# Patient Record
Sex: FEMALE | Race: WHITE
[De-identification: names, ages, dates, MRNs, and addresses within clinical notes are randomized per-mention and may not be internally consistent; named-entity substitution may affect disease eponyms.]

---

## 2017-01-04 NOTE — DIAGNOSTIC IMAGING REPORT
Transabdominal and transvaginal pelvic ultrasound.



INDICATION: Pelvic pain.



FINDINGS: The uterus is 9 x 6 x 4.1 cm. The endometrial stripe

is 0.4 cm. Trace amount of luminal fluid is seen within the

endometrial cavity.



The right ovary is 3.9 x 3.4 x 4.6 cm with arterial waveforms

demonstrated. The left ovary is 3.4 x 2.7 x 2.1 cm with arterial

waveforms also seen. There are multiple follicles noted in both

ovaries with a dominant follicle/small simple-appearing cyst

measuring 3.2 cm in the right ovary noted at this time. This

appears to be simple with resolution of the previously seen

thickened wall.



IMPRESSION: No significant free fluid in the pelvis. Remaining

3.2 cm simple-appearing right ovarian cyst. No suspicious

lesion.



Dictated by:



Dictated on workstation # [id number]

## 2017-04-06 NOTE — DIAGNOSTIC IMAGING REPORT
PROCEDURE: CT abdomen and pelvis with contrast, rule out

appendicitis.



TECHNIQUE: Multiple contiguous axial images were obtained

through the abdomen and pelvis after the administration of

intravenous contrast.



INDICATION: Right lower quadrant pain for one day, nausea,

headache.



CONTRAST: 100 mL Omnipaque 350 was given intravenously.



Comparison study: CT scan dated 12/12/2011.



FINDINGS: The lung bases are clear. The gallbladder is seen to

be absent. No ductal dilatation is present. The liver, spleen,

pancreas, adrenal glands, and kidneys appear normal. There is no

ascites or free air. There is normal appearance of the appendix

and bowel loops. Uterus appears normal. Several follicles are

seen in both ovaries. There is no fluid in the cul-de-sac. No

abnormal adenopathy is present. The vascular and osseous

structures appear normal.



IMPRESSION: There are bilateral ovarian follicles. No acute

findings are present.













Dictated by:



Dictated on workstation # [id number]

## 2017-04-06 NOTE — ED GENERAL
General


Chief Complaint:  General Problems/Pain


Stated Complaint:  RIGHT SIDE ABD PAIN HEADACHE


Nursing Triage Note:  


C/O H/A SINCE YESTERDAY. C/O R-SIDE PAIN WORSE WHEN COUGHING OR LAUGHING. 

DENIES 


FLU-LIKE SXS. DENIES NECK STIFFNESS. NO HX OF H/A. DID SEE A GLARE YESTERDAY 


WITH H/A.


Nursing Sepsis Screen:  No Definite Risk


Source of Information:  Patient


Exam Limitations:  No Limitations





History of Present Illness


Time Seen by Provider:  11:16


Initial Comments


31-year-old female patient presents to the emergency department complains of 

right lower quadrant pain and headache beginning yesterday.  Patient denies 

diarrhea, vomiting, dysuria, frequency, hematuria.  Does complain of mild 

nausea.  Denies upper respiratory symptoms.


patient is a  A1(D&C for conjoined twins) female.


Timing/Duration:  12-24 Hours, Getting Worse


Modifying Factors:  worse with Movement, worse with Other (worse with laughing, 

coughing, palpation.)





Allergies and Home Medications


Allergies


Coded Allergies:  


     Zolpidem Tartrate (Unverified  Adverse Reaction, Unknown, 5/3/11)





Constitutional:  chills, No fever, No malaise


EENTM:  no symptoms reported


Respiratory:  No cough, No phlegm, No short of breath


Cardiovascular:  No chest pain, No syncope


Gastrointestinal:  abdominal pain (RLQ), No constipation, No diarrhea, loss of 

appetite, nausea, No vomiting


Genitourinary:  No decreased output, No discharge, No dysuria, No frequency, No 

hematuria, No pain


Pregnant:  No


Musculoskeletal:  no symptoms reported


Skin:  no symptoms reported


Psychiatric/Neurological:  Headache, Denies Numbness, Denies Paresthesia, 

Denies Seizure, Denies Tingling, Denies Weakness


All Other Systems Reviewed


Negative Unless Noted:  Yes (Negative except where noted.)





Past Medical-Social-Family Hx


Patient Social History


Alcohol Use:  Occasionally Uses


Recreational Drug Use:  No


Smoking Status:  Current Everyday Smoker


2nd Hand Smoke Exposure:  Yes


Recent Foreign Travel:  No


Contact w/Someone Who Travel:  No


Recent Infectious Disease Expo:  No


Recent Hopitalizations:  No





Surgeries


HX Surgeries:  Yes (enlargement of urinary tract, PATIENCE CHOLEY )


Surgeries:  Gallbladder





Respiratory


Hx Respiratory Disorders:  No





Cardiovascular


Hx Cardiac Disorders:  No





Neurological


Hx Neurological Disorders:  No





Reproductive System


Pregnant:  No (IMPLANT)


Hx :  2


Hx Para:  1


Hx Total # of Abortions (Spona:  1 (d&c for conjoined twins.)


Hx Reproductive Disorders:  No (IMPLANT DEVICE)


Sexually Transmitted Disease:  No





Genitourinary


Hx Genitourinary Disorders:  No





Gastrointestinal


Hx Gastrointestinal Disorders:  No





Musculoskeletal


Hx Musculoskeletal Disorders:  No





Endocrine


Hx Endocrine Disorders:  No





HEENT


HX ENT Disorders:  No





Psychosocial


Hx Psychiatric Problems:  No





Blood Transfusions


Hx Blood Disorders:  No





Reviewed Nursing Assessment


Reviewed/Agree w Nursing PMH:  Yes





Family Medical History


Significant Family History:  No Pertinent Family Hx





Physical Exam


Vital Signs





Vital Sign - Last 12Hours








 17





 10:31


 


Temp 98.7


 


Pulse 73


 


Resp 20


 


B/P (MAP) 94/50


 


Pulse Ox 100


 


O2 Delivery Room Air





Capillary Refill : Less Than 3 Seconds


General Appearance:  No Apparent Distress, WD/WN


HEENT:  PERRL/EOMI, Pharynx Normal


Neck:  Normal Inspection, Supple


Respiratory:  Lungs Clear, Normal Breath Sounds, No Respiratory Distress


Cardiovascular:  Regular Rate, Rhythm, No Murmur


Gastrointestinal:  Normal Bowel Sounds, No Organomegaly, Soft, No Distended, 

Guarding (RLQ), Rebound (RLQ), Tenderness (RLQ), Other (positive Rovsing sign)


Back:  Normal Inspection, No CVA Tenderness


Extremity:  Normal Capillary Refill, No Pedal Edema


Neurologic/Psychiatric:  Alert, Oriented x3, No Motor/Sensory Deficits, Normal 

Mood/Affect, CNs II-XII Norm as Tested


Skin:  Normal Color, Warm/Dry





Progress/Results/Core Measures


Results/Orders


Lab Results





Laboratory Tests








Test


  17


11:40 17


13:20 Range/Units


 


 


Urine Color YELLOW    


 


Urine Clarity CLEAR    


 


Urine pH 5   5-9  


 


Urine Specific Gravity 1.015 L  1.016-1.022  


 


Urine Protein NEGATIVE   NEGATIVE  


 


Urine Glucose (UA) NEGATIVE   NEGATIVE  


 


Urine Ketones NEGATIVE   NEGATIVE  


 


Urine Nitrite NEGATIVE   NEGATIVE  


 


Urine Bilirubin NEGATIVE   NEGATIVE  


 


Urine Urobilinogen NORMAL   NORMAL  MG/DL


 


Urine Leukocyte Esterase 1+ H  NEGATIVE  


 


Urine RBC (Auto) NEGATIVE   NEGATIVE  


 


Urine RBC RARE    /HPF


 


Urine WBC 0-2    /HPF


 


Urine Squamous Epithelial


Cells >50 H


  


   /HPF


 


 


Urine Crystals NONE    /LPF


 


Urine Bacteria NEGATIVE    /HPF


 


Urine Casts NONE    /LPF


 


Urine Mucus NEGATIVE    /LPF


 


Urine Culture Indicated NO    


 


White Blood Count


  


  7.6 


  4.3-11.0


10^3/uL


 


Red Blood Count


  


  4.00 L


  4.35-5.85


10^6/uL


 


Hemoglobin  12.8  11.5-16.0  G/DL


 


Hematocrit  37  35-52  %


 


Mean Corpuscular Volume  93  80-99  FL


 


Mean Corpuscular Hemoglobin  32  25-34  PG


 


Mean Corpuscular Hemoglobin


Concent 


  34 


  32-36  G/DL


 


 


Red Cell Distribution Width  12.0  10.0-14.5  %


 


Platelet Count


  


  268 


  130-400


10^3/uL


 


Mean Platelet Volume  9.2  7.4-10.4  FL


 


Neutrophils (%) (Auto)  66  42-75  %


 


Lymphocytes (%) (Auto)  26  12-44  %


 


Monocytes (%) (Auto)  7  0-12  %


 


Eosinophils (%) (Auto)  1  0-10  %


 


Basophils (%) (Auto)  0  0-10  %


 


Neutrophils # (Auto)  5.0  1.8-7.8  X 10^3


 


Lymphocytes # (Auto)  1.9  1.0-4.0  X 10^3


 


Monocytes # (Auto)  0.6  0.0-1.0  X 10^3


 


Eosinophils # (Auto)


  


  0.1 


  0.0-0.3


10^3/uL


 


Basophils # (Auto)


  


  0.0 


  0.0-0.1


10^3/uL


 


Sodium Level  138  135-145  MMOL/L


 


Potassium Level  3.9  3.6-5.0  MMOL/L


 


Chloride Level  109 H   MMOL/L


 


Carbon Dioxide Level  22  21-32  MMOL/L


 


Anion Gap  7  5-14  MMOL/L


 


Blood Urea Nitrogen  12  7-18  MG/DL


 


Creatinine


  


  0.84 


  0.60-1.30


MG/DL


 


Estimat Glomerular Filtration


Rate 


  > 60 


   


 


 


BUN/Creatinine Ratio  14   


 


Glucose Level  81    MG/DL


 


Calcium Level  8.4 L 8.5-10.1  MG/DL


 


Total Bilirubin  0.6  0.1-1.0  MG/DL


 


Aspartate Amino Transf


(AST/SGOT) 


  14 


  5-34  U/L


 


 


Alanine Aminotransferase


(ALT/SGPT) 


  10 


  0-55  U/L


 


 


Alkaline Phosphatase  49    U/L


 


Total Protein  5.9 L 6.4-8.2  G/DL


 


Albumin  3.7  3.2-4.5  G/DL


 


Lipase  37  8-78  U/L








JULIETA Moore


Cbc With Automated Diff (17 11:33)


Comprehensive Metabolic Panel (17 11:33)


Lipase (17 11:33)


Ua Culture If Indicated (17 11:33)


Saline Lock/Iv-Start (17 11:33)


Urine Pregnancy Bedside (17 11:33)


Ct Abd/Pelv W (Appendicitis) (17 11:33)


Iohexol Injection (Omnipaque 350 Mg/Ml 1 (17 12:00)


Ns (Ivpb) (Sodium Chloride 0.9% Ivpb Bag (17 12:00)


Ketorolac Injection (Toradol Injection) (17 14:10)





Medications Given in ED





Current Medications








 Medications  Dose


 Ordered  Sig/Fariha


 Route  Start Time


 Stop Time Status Last Admin


Dose Admin


 


 Iohexol  100 ml  ONCE  ONCE


 IV  17 12:00


 17 12:01 DC 17 12:23


100 ML


 


 Sodium Chloride  100 ml  ONCE  ONCE


 IV  17 12:00


 17 12:01 DC 17 12:23


80 ML








Vital Signs/I&O





Vital Sign - Last 12Hours








 17





 10:31


 


Temp 98.7


 


Pulse 73


 


Resp 20


 


B/P (MAP) 94/50


 


Pulse Ox 100


 


O2 Delivery Room Air














Blood Pressure Mean:  65











Diagnostic Imaging





   Diagonstic Imaging:  CT


   Plain Films/CT/US/NM/MRI:  abdomen, pelvis


Comments


FINDINGS: The lung bases are clear. The gallbladder is seen to be absent. No 

ductal dilatation is present. The liver, spleen, pancreas, adrenal glands, and 

kidneys appear normal. There is no ascites or free air. There is normal 

appearance of the appendix and bowel loops. Uterus appears normal. Several 

follicles are seen in both ovaries. There is no fluid in the cul-de-sac. No 

abnormal adenopathy is present. The vascular and osseous structures appear 

normal. IMPRESSION: There are bilateral ovarian follicles. No acute findings 

are present. Dictated on workstation # [id number]


   Reviewed:  Reviewed by Me (radiology report reviewed by me. )





Departure


Communication


Progress Notes


patient seen and evaluated.  Denies need for pain or nausea at this time. 


1350  awaiting labs.  diagnostic findings discussed with the patient.  patient 

now states she would like something for pain.


1420  all laboratory and diagnostic findings discussed with the patient.  

patient is now agitated due to not being diagnoses with appendicitis.  I have 

discussed with the patient that her WBC count is in the normal range with a CT 

scan showing follicles on the bilateral ovaries w/o appendicitis or 

inflammatory changes. I have discussed that we do not see anything concerning 

for appendicitis at this time, but to return immediately to the ED if symptoms 

worsen or change.  Patient is agitated and states "I knew this was going to 

happen!  I am in pain and that's just it!"  I've advised the patient to follow-

up with the primary care physician of her choice for recheck and further 

evaluation.





Impression


Impression:  


 Primary Impression:  


 Abdominal pain


 Qualified Codes:  R10.31 - Right lower quadrant pain


Disposition:   HOME, SELF-CARE


Condition:  Improved





Departure-Patient Inst.


Decision time for Depature:  13:53


Referrals:  


NO,LOCAL PHYSICIAN (PCP/Family)


Primary Care Physician


Patient Instructions:  Acute Abdomen (Belly Pain), Adult (DC)





Add. Discharge Instructions:  


All discharge instructions reviewed with patient and/or family. Voiced 

understanding.  Tylenol over-the-counter as directed for pain.  Ibuprofen 800 

mg by mouth every 8 hours as needed for pain.  Drink plenty of fluids.  Rest.  

Follow-up with your primary care physician for recheck, call for appointment 

time.  Return to the emergency department for worsened pain, fever, vomiting, 

inability to urinate, rectal bleeding, or any other concerns.


Work/School Note:  Local Medical Staff Listing, Work Release Form   Date Seen 

in the Emergency Department:  2017


   Return to Work:  2017











JULIETA WISE 2017 11:17

## 2017-05-07 NOTE — XMS REPORT
Patient Summary (HL7 CCD)

 Created on: 2017



HEAVEN RESENDIZ

External Reference #: [id number]

: 1985

Sex: Female



Demographics







 Address  [address] [place]  [address]

 

 Home Phone  [phone]

 

 Preferred Language  Unknown

 

 Marital Status  Unknown

 

 Taoist Affiliation  Unknown

 

 Race  WHITE

 

 Ethnic Group  Not  or 





Author







 Author  MULU FORD

 

 Organization  Unknown

 

 Address  [address]

[place]  [address]



 

 Phone  [phone]







Care Team Providers







 Care Team Member Name  Role  Phone

 

 PATRICIA MORAES MD  Attphys  [phone]



                                                                



Allergies

          Unknown or Not Available.                                            
                                            



Active Medications

          Unknown or Not Available.                                            
                                            



Problems

          Unknown or Not Available.                                            
                                            



Procedures

          





 Procedure        Code        Procedure Type        Date    

 

 Incision and drainage of hematoma, seroma or fluid collection        09571    
    CPT        2016    

 

 PREGNANCY TEST URINE        [id number]        SNOMED CT        2016    



                                                                               
                   



Results

          





 PREGNANCY TEST URINE - Collect Date/Time: 2016 09:27     

 

 Test Name        Code        Test Result        Test Units        Test Ref 
Range    

 

 PREGNANCY TEST UR        2106-3        NEGATIVE        N/A             



                                                                              



Function Status

          Unknown or Not Available.                                            
                                  



History of Immunizations

          Unknown or Not Available.                                            
                        



Plan of Treatment

          Unknown or Not Available.                                            
                        



Social History

          





 Smoking Status        Code        Start Date        End Date    

 

 Current every day smoker        [id number]                      



                                                                               
         



Vital Signs

          





 Vital Sign        Value        Unit        Date/Time        Recent/Initial?    

 

 Respiratory Rate        16        /min        2016 10:39        Initial 
VS    

 

 Heart Rate        66        /min        2016 10:39        Initial VS    

 

 O2 % BldC Oximetry        100        %        2016 10:39        Initial 
VS    

 

 BP Systolic        93        mm[Hg]        2016 10:40        Initial VS 
   

 

 BP Diastolic        46        mm[Hg]        2016 10:40        Initial VS
    

 

 BP Systolic        98        mm[Hg]        2016 11:55        Most Recent 
VS    

 

 BP Diastolic        54        mm[Hg]        2016 11:55        Most 
Recent VS    

 

 Respiratory Rate        15        /min        2016 11:55        Most 
Recent VS    

 

 Heart Rate        53        /min        2016 11:55        Most Recent VS
    

 

 O2 % BldC Oximetry        99        %        2016 11:55        Most 
Recent VS    



                                                                               
                   



Function Status

          Unknown or Not Available.                                            
    



Goals

          Unknown or Not Available.                                            
              



ASSESSMENTS

          Unknown or Not Available.                                            
                        



Health Concerns Section

          Unknown or Not Available.

## 2017-05-07 NOTE — XMS REPORT
[facility]

 Created on: 2015



Shanae Martini

External Reference #: [id number]

: 1985

Sex: Female



Demographics







 Address  [address] [place]  [address]

 

 Home Phone  [phone]

 

 Preferred Language  Unknown

 

 Marital Status  Unknown

 

 Moravian Affiliation  Unknown

 

 Race  White

 

 Ethnic Group  Not  or 





Author







 Author  SAMMY PATRICK

 

 [facility]  [employer]

 

 Address  Unknown

 

 Phone  Unavailable







Care Team Providers







 Care Team Member Name  Role  Phone

 

 SAMMY PATRICK  CP  Unavailable



                                                                



Allergies

          No Known Allergies                                                   
                                     



Problems

          





 Problem Type  Condition  Code  Onset Dates  Condition Status

 

 Assessment  Elevated prolactin level  E22.9     Active

 

 Problem  Frequent loose stools  R19.7     Active

 

 Problem  Family history of ovarian cancer  Z80.41     Active

 

 Problem  OCP (oral contraceptive pills) initiation  Z30.011     Active

 

 Problem  Tobacco use  Z72.0     Active

 

 Problem  Low back pain  M54.5     Active

 

 Problem  Depression with anxiety  F41.8     Active

 

 Problem  Substance abuse  F19.10     Active



                                                                               
                                                                               



Medications

          No Known Medications                                                 
                             



Results

          No Known Results                                                     
               



Summary Purpose

          [employer] Submission

## 2017-05-07 NOTE — XMS REPORT
[facility]

 Created on: 2015



Shanae Martini

External Reference #: [id number]

: 1985

Sex: Female



Demographics







 Address  [address] [place]  [address]

 

 Home Phone  [phone]

 

 Preferred Language  Unknown

 

 Marital Status  Unknown

 

 Mandaen Affiliation  Unknown

 

 Race  White

 

 Ethnic Group  Not  or 





Author







 Author  SHANTE VARGAS

 

 Organization  [employer]

 

 Address  Unknown

 

 Phone  Unavailable







Care Team Providers







 Care Team Member Name  Role  Phone

 

 SHANTE VARGAS  CP  Unavailable



                                                                



Allergies

          No Known Allergies                                                   
                                     



Problems

          





 Problem Type  Condition  Code  Onset Dates  Condition Status

 

 Problem  Frequent loose stools  R19.7     Active

 

 Problem  Family history of ovarian cancer  Z80.41     Active

 

 Problem  OCP (oral contraceptive pills) initiation  Z30.011     Active

 

 Problem  Tobacco use  Z72.0     Active

 

 Problem  Low back pain  M54.5     Active

 

 Problem  Depression with anxiety  F41.8     Active

 

 Problem  Substance abuse  F19.10     Active



                                                                               
                                                                     



Medications

          No Known Medications                                                 
                             



Results

          No Known Results                                                     
               



Summary Purpose

          [employer] Submission

## 2017-05-07 NOTE — XMS REPORT
[facility]

 Created on: 2016



Shanae Martini

External Reference #: [id number]

: 1985

Sex: Female



Demographics







 Address  [address] [place]  [address]

 

 Home Phone  [phone]

 

 Preferred Language  Unknown

 

 Marital Status  Unknown

 

 Jainism Affiliation  Unknown

 

 Race  White

 

 Ethnic Group  Not  or 





Author







 ALEKSANDER Chung

 

 [facility]  [employer]

 

 Address  Unknown

 

 Phone  Unavailable







Care Team Providers







 Care Team Member Name  Role  Phone

 

 ALEKSANDER HONEYCUTT  CP  Unavailable



                                                                



Allergies

          No Known Allergies                                                   
                                     



Problems

          





 Problem Type  Condition  Code  Onset Dates  Condition Status

 

 Problem  Frequent loose stools  R19.7     Active

 

 Problem  Family history of ovarian cancer  Z80.41     Active

 

 Problem  OCP (oral contraceptive pills) initiation  Z30.011     Active

 

 Problem  Tobacco use  Z72.0     Active

 

 Problem  Low back pain  M54.5     Active

 

 Problem  Depression with anxiety  F41.8     Active

 

 Problem  Substance abuse  F19.10     Active



                                                                               
                                                                     



Medications

          No Known Medications                                                 
                             



Results

          No Known Results                                                     
               



Summary Purpose

          [employer]Clinical[employer] Submission

## 2017-05-07 NOTE — XMS REPORT
[facility]

 Created on: 2016



Shanae Martini

External Reference #: [id number]

: 1985

Sex: Female



Demographics







 Address  [address] [place]  [address]

 

 Home Phone  [phone]

 

 Preferred Language  Unknown

 

 Marital Status  Unknown

 

 Synagogue Affiliation  Unknown

 

 Race  White

 

 Ethnic Group  Not  or 





Author







 ALEKSANDER Chung

 

 [facility]  [employer]

 

 Address  Unknown

 

 Phone  Unavailable







Care Team Providers







 Care Team Member Name  Role  Phone

 

 ALEKSANDER HONEYCUTT  CP  Unavailable



                                                                



Allergies, Adverse Reactions, Alerts

          





 Substance  Reaction  Event Type

 

 Ambien  Info Not Available  Drug Allergy



                                                                               
         



Problems

          





 Problem Type  Condition  Code  Onset Dates  Condition Status

 

 Assessment  Contact dermatitis and eczema due to plant  L24.7     Active

 

 Problem  Frequent loose stools  R19.7     Active

 

 Problem  Family history of ovarian cancer  Z80.41     Active

 

 Problem  OCP (oral contraceptive pills) initiation  Z30.011     Active

 

 Problem  Tobacco use  Z72.0     Active

 

 Problem  Low back pain  M54.5     Active

 

 Problem  Depression with anxiety  F41.8     Active

 

 Problem  Substance abuse  F19.10     Active



                                                                               
                                                                               



Medications

          No Known Medications                                                 
                                       



Procedures

          





 Procedure  Coding System  Code  Date

 

 THER/PROPH/DIAG INJ, SC/IM  CPT-4  80421  2016

 

 DEXAMETHASONE 4MG/ML (PER 1 MG)  CPT-4    2016

 

 DEPO MEDROL 40 MG/ML  CPT-4    2016

 

 Office Visit, Est Pt., Level 3  CPT-4  29166  2016



                                                                               
                                                 



Vital Signs

          





 Date/Time:  2016

 

 Cardiac Monitoring Heart Rate  78 bpm

 

 Weight  158.0 lbs

 

 Height  67 in

 

 Blood Pressure Diastolic  60 mmHg

 

 Blood Pressure Systolic  102 mmHg



                                                                              



Results

          No Known Results                                                     
               



Summary Purpose

          eClinicalWorks Submission

## 2017-05-07 NOTE — XMS REPORT
[facility]

 Created on: 2015



Shanae Martini

External Reference #: [id number]

: 1985

Sex: Female



Demographics







 Address  [address] 

[place]  [address]

 

 Home Phone  [phone]

 

 Preferred Language  Unknown

 

 Marital Status  Unknown

 

 Worship Affiliation  Unknown

 

 Race  White

 

 Ethnic Group  Not  or 





Author







 Author  SAMMY PATRICK

 

 [facility]  [employer]Clinical[employer]

 

 Address  Unknown

 

 Phone  Unavailable







Care Team Providers







 Care Team Member Name  Role  Phone

 

 SAMMY PATRICK    Unavailable



                                                                



Allergies, Adverse Reactions, Alerts

          





 Substance  Reaction  Event Type

 

 Ambien  Info Not Available  Drug Allergy



                                                                               
         



Problems

          





 Problem Type  Condition  Code  Onset Dates  Condition Status

 

 Assessment  Lower abdominal pain  R10.30     Active

 

 Assessment  PID (pelvic inflammatory disease)  N73.9     Active

 

 Assessment  Vaginal spotting  N92.0     Active

 

 Assessment  Oral contraceptive pill surveillance  Z30.41     Active

 

 Problem  Frequent loose stools  R19.7     Active

 

 Problem  Family history of ovarian cancer  Z80.41     Active

 

 Problem  OCP (oral contraceptive pills) initiation  Z30.011     Active

 

 Problem  Tobacco use  Z72.0     Active

 

 Problem  Low back pain  M54.5     Active

 

 Problem  Depression with anxiety  F41.8     Active

 

 Problem  Substance abuse  F19.10     Active



                                                                               
                                                                               
                              



Medications

          





 Medication  Code System  Code  Instructions  Start Date  End Date  Status  
Dosage

 

 Sprintec 28  NDC  [address]-31  0.25-35 MG-MCG Orally Once a day  2015        1 tablet

 

 Ceftriaxone Sodium  NDC  [address]-04  250 MG Injection once  2015   
     as directed

 

 Doxycycline Monohydrate  NDC  [address]-02  100 MG Orally every 12 hrs  Nov 30
, 2015  Dec 14, 2015     1 capsule



                                                                               
                             



Procedures

          





 Procedure  Coding System  Code  Date

 

 URINE PREGNANCY TEST  CPT-4  08136  2015

 

 ROCEPHIN 250 MG (IM)  CPT-4    2015

 

 URINALYSIS, AUTO, W/O SCOPE  CPT-4  26102  2015

 

 URINE CULTURE/COLONY COUNT  CPT-4  81905  2015

 

 THER/PROPH/DIAG INJ, SC/IM  CPT-4  60096  2015

 

 Office Visit, Est Pt., Level 3  CPT-4  39078  2015



                                                                               
                                                                     



Vital Signs

          





 Date/Time:  2015

 

 Temperature  98.3 F

 

 Weight  168.0 lbs

 

 Height  67 in

 

 BMI  26.31 Index

 

 Blood Pressure Diastolic  70 mmHg

 

 Blood Pressure Systolic  110 mmHg

 

 Cardiac Monitoring Heart Rate  64 bpm



                                                                              



Results

          No Known Results                                                     
               



Summary Purpose

          eClinicalWorks Submission

## 2017-05-07 NOTE — XMS REPORT
MU2 Ambulatory Summary

 Created on: 2016



Shanae Martini

External Reference #: [id number]

: 1985

Sex: Female



Demographics







 Address  [address] 

[place]  [address]

 

 Home Phone  [phone]

 

 Preferred Language  English

 

 Marital Status  

 

 Evangelical Affiliation  Unknown

 

 Race  White

 

 Ethnic Group  Not  or 





Author







 Jose Brunner

 

 [facility]

 

 Address  [address]

[place]  [id number]



 

 Phone  [phone]







Care Team Providers







 Care Team Member Name  Role  Phone

 

 Jose Butt  PCP  Unavailable







Allergies and Adverse Reactions







 Name  Reaction  Notes

 

 NO KNOWN DRUG ALLERGIES      







Plan of Treatment

Not available.



Medications







 Active 

 

 Name  Start Date  Estimated Completion Date  SIG  Comments

 

 Sprintec (28) 0.25-35 mg-mcg oral tablet        take 1 tablet by oral route 
once daily   

 

 sertraline 25 mg oral tablet        take 1 tablet (25 mg) by oral route once 
daily   









  

 

 Name  Start Date  Expiration Date  SIG  Comments

 

 Phenergan 25 mg oral tablet  2010  take 1 tablet by oral route 
3 times a day as needed for 20 days   







Problem List

Not available.



Vital Signs







 Date  Time  BP-Sys(mm[Hg]  BP-Arely(mm[Hg])  HR(bpm)  RR(rpm)  Temp  WT  HT  HC  
BMI  BSA  BMI Percentile  O2 Sat(%)

 

 2016  2:45:00 PM  100 mmHg  60 mmHg  78 bpm  16 rpm  96.2 F  152 lbs  67 
in     23.81 kg/m2  1.81 m2     100 %

 

 2016  2:01:00 PM  114 mmHg  70 mmHg  56 bpm  16 rpm  96.8 F  155 lbs  67 
in     24.2762 kg/m  1.8231 m     100 %

 

 2010  1:37:00 PM  118 mmHg  68 mmHg  82 bpm  20 rpm     171.75 lbs        
          

 

 2010  1:55:00 PM  128 mmHg  78 mmHg  88 bpm  20 rpm     175 lbs          
        

 

 2010  1:34:00 PM  108 mmHg  68 mmHg     18 rpm     177 lbs               
   

 

 2010  1:24:00 PM  127 mmHg  63 mmHg  79 bpm  18 rpm  98.2 F  173.375 lbs  
67 in     27.15 kg/m2  1.93 m2     100 %







Social History







 Name  Description  Comments

 

 Tobacco Use     Pt states that she smokes 1/2 ppd from 2009 until 2010.

 

 Regular Exercise     walking







History of Procedures







 Date Ordered  Description  Order Status

 

 3/11/2010 12:00 AM  ALPHA-FETOPROTEIN SERUM  Reviewed

 

 2010 12:00 AM  CYTOPATH C/V THIN LAYER  Reviewed

 

 2010 12:00 AM  CHLAMYDIA CULTURE  Reviewed

 

 2010 12:00 AM  N.GONORRHOEAE DNA AMP PROB  Reviewed

 

 2010 12:00 AM  CULTURE OTHR SPECIMN AEROBIC  Reviewed

 

 2010 12:00 AM  SPECIMEN HANDLING OFFICE-LAB  Reviewed

 

 6/3/2010 12:00 AM  INSERT INTRAUTERINE DEVICE  Reviewed

 

 2010 12:00 AM  URINE PREGNANCY TEST  Reviewed

 

 2010 12:00 AM  COMPLETE CBC W/AUTO DIFF WBC  Reviewed

 

 2010 12:00 AM  CHORIONIC GONADOTROPIN TEST  Reviewed

 

 2010 12:00 AM  CULTURE OTHR SPECIMN AEROBIC  Reviewed

 

 2010 12:00 AM  SPECIMEN HANDLING OFFICE-LAB  Reviewed

 

 2010 12:00 AM  OBSTETRIC PANEL  Reviewed

 

 2010 12:00 AM  SPECIMEN HANDLING OFFICE-LAB  Reviewed

 

 2010 12:00 AM  CULTURE OTHR SPECIMN AEROBIC  Reviewed

 

 2010 12:00 AM  CYTOPATH C/V THIN LAYER  Reviewed

 

 2010 12:00 AM  HIV-1ANTIBODY  Reviewed

 

 2010 12:00 AM  URINALYSIS AUTO W/SCOPE  Reviewed

 

 2010 12:00 AM  CHLAMYDIA CULTURE  Reviewed

 

 2010 12:00 AM  N.GONORRHOEAE DNA AMP PROB  Reviewed

 

 1/15/2010 12:00 AM  URINE PREGNANCY TEST  Reviewed

 

 2010 12:00 AM  OB US < 14 WKS SINGLE FETUS  Reviewed







Results Summary







 Data and Description  Results

 

 2010 2:26 PM  BETA HCG QUANT 3.91 WBC 10.4 RBC 4.57 HGB 14.30 g/dLHCT 
42.60 %MCV 93.0 fLMCH 31.30 pgMCHC 33.60 g/dLRDW CV 12.30 %MPV 9.50 fLPLT 373 %
NEUT 62.0 %%LYMP 28.0 %%MONO 7.80 %%EOS 1.80 %%BASO 0.40 %#NEUT 6.46 #LYMP 2.91 
#MONO 0.81 #EOS 0.19 #BASO 0.04 

 

 2016 9:27 AM  PREGNANCY TEST UR NEGATIVE 







History Of Immunizations

Not available.



History of Past Illness







 Name  Date of Onset  Comments

 

 Pregnancy, Other Normal  2010  1:34PM   

 

    2010  2:26PM   

 

 Anemia      

 

 Last Pap       2:39PM   

 

    Mar 11 2010 10:14AM   

 

 Pregnancy, Other Normal  Mar 11 2010 10:26AM   

 

    2010  5:14PM   

 

 Contraceptive Counseling  May 25 2010  1:51PM   

 

 Vulvovaginitis  May 25 2010  1:51PM   

 

 IUD Insertion  2010  1:40PM   

 

 Dysfunctional Uterine Bleeding  2010  1:36PM   

 

 Hematoma of leg, right, initial encounter  2016  2:01PM   

 

 Postoperative Follow-up  Aug 24 2016  2:46PM   







Payers







 Insurance Name  Company Name  Plan Name  Plan Number  Policy Number  Policy 
Group Number  Start Date

 

    [place] [employer]  [place] [employer]     
[id number]     N/A

 

    [facility]  [facility]     [id number]     N/A

 

    [employer]  [employer]     [facility] [id number]     N/A







History of Encounters







 Visit Date  Visit Type  Provider

 

 2016  Office visit  Jose Butt MD

 

 2016  [facility]  Jose Butt MD

 

 2016  Office visit  Jose Butt MD

 

 2010  Office visit  Stefanie Sanches MD

 

 2010  Office visit  Stefanie Sanches MD

 

 2010  Office visit  Stefanie Sanches MD

 

 2010  Voided  Willie Smart MD

 

 3/11/2010  Office visit  Stefanie Sanches MD

 

 2010  Office visit  Stefanie Sanches MD

 

 2010  Office visit  Stefanie Sanches MD

## 2017-05-07 NOTE — XMS REPORT
MU2 Ambulatory Summary

 Created on: 2016



Shanae Martini

External Reference #: [id number]

: 1985

Sex: Female



Demographics







 Address  [address] 

[place]  [address]

 

 Home Phone  [phone]

 

 Preferred Language  English

 

 Marital Status  

 

 Mandaeism Affiliation  Unknown

 

 Race  White

 

 Ethnic Group  Not  or 





Author







 Jose Brunner

 

 [facility]

 

 Address  [address]

[place]  [id number]



 

 Phone  [phone]







Care Team Providers







 Care Team Member Name  Role  Phone

 

 [place] Butt  PCP  Unavailable







Allergies and Adverse Reactions







 Name  Reaction  Notes

 

 NO KNOWN DRUG ALLERGIES      







Plan of Treatment

Not available.



Medications







 Active 

 

 Name  Start Date  Estimated Completion Date  SIG  Comments

 

 Sprintec (28) 0.25-35 mg-mcg oral tablet        take 1 tablet by oral route 
once daily   

 

 sertraline 25 mg oral tablet        take 1 tablet (25 mg) by oral route once 
daily   









  

 

 Name  Start Date  Expiration Date  SIG  Comments

 

 Phenergan 25 mg oral tablet  2010  take 1 tablet by oral route 
3 times a day as needed for 20 days   







Problem List

Not available.



Vital Signs







 Date  Time  BP-Sys(mm[Hg]  BP-Arely(mm[Hg])  HR(bpm)  RR(rpm)  Temp  WT  HT  HC  
BMI  BSA  BMI Percentile  O2 Sat(%)

 

 2016  1:39:00 PM  110 mmHg  66 mmHg  83 bpm  16 rpm  96.6 F              
      97 %

 

 2016  2:45:00 PM  100 mmHg  60 mmHg  78 bpm  16 rpm  96.2 F  152 lbs  67 
in     23.8063 kg/m  1.8053 m     100 %

 

 2016  2:01:00 PM  114 mmHg  70 mmHg  56 bpm  16 rpm  96.8 F  155 lbs  67 
in     24.28 kg/m2  1.82 m2     100 %

 

 2010  1:37:00 PM  118 mmHg  68 mmHg  82 bpm  20 rpm     171.75 lbs        
          

 

 2010  1:55:00 PM  128 mmHg  78 mmHg  88 bpm  20 rpm     175 lbs          
        

 

 2010  1:34:00 PM  108 mmHg  68 mmHg     18 rpm     177 lbs               
   

 

 2010  1:24:00 PM  127 mmHg  63 mmHg  79 bpm  18 rpm  98.2 F  173.375 lbs  
67 in     27.15 kg/m2  1.93 m2     100 %







Social History







 Name  Description  Comments

 

 Tobacco Use     Pt states that she smokes 1/2 ppd from 2009 until 2010.

 

 Regular Exercise     walking







History of Procedures







 Date Ordered  Description  Order Status

 

 3/11/2010 12:00 AM  ALPHA-FETOPROTEIN SERUM  Reviewed

 

 2010 12:00 AM  CYTOPATH C/V THIN LAYER  Reviewed

 

 2010 12:00 AM  CHLAMYDIA CULTURE  Reviewed

 

 2010 12:00 AM  N.GONORRHOEAE DNA AMP PROB  Reviewed

 

 2010 12:00 AM  CULTURE OTHR SPECIMN AEROBIC  Reviewed

 

 2010 12:00 AM  SPECIMEN HANDLING OFFICE-LAB  Reviewed

 

 6/3/2010 12:00 AM  INSERT INTRAUTERINE DEVICE  Reviewed

 

 2010 12:00 AM  URINE PREGNANCY TEST  Reviewed

 

 2010 12:00 AM  COMPLETE CBC W/AUTO DIFF WBC  Reviewed

 

 2010 12:00 AM  CHORIONIC GONADOTROPIN TEST  Reviewed

 

 2010 12:00 AM  CULTURE OTHR SPECIMN AEROBIC  Reviewed

 

 2010 12:00 AM  SPECIMEN HANDLING OFFICE-LAB  Reviewed

 

 2010 12:00 AM  OBSTETRIC PANEL  Reviewed

 

 2010 12:00 AM  SPECIMEN HANDLING OFFICE-LAB  Reviewed

 

 2010 12:00 AM  CULTURE OTHR SPECIMN AEROBIC  Reviewed

 

 2010 12:00 AM  CYTOPATH C/V THIN LAYER  Reviewed

 

 2010 12:00 AM  HIV-1ANTIBODY  Reviewed

 

 2010 12:00 AM  URINALYSIS AUTO W/SCOPE  Reviewed

 

 2010 12:00 AM  CHLAMYDIA CULTURE  Reviewed

 

 2010 12:00 AM  N.GONORRHOEAE DNA AMP PROB  Reviewed

 

 1/15/2010 12:00 AM  URINE PREGNANCY TEST  Reviewed

 

 2010 12:00 AM  OB US < 14 WKS SINGLE FETUS  Reviewed







Results Summary







 Data and Description  Results

 

 2010 2:26 PM  BETA HCG QUANT 3.91 WBC 10.4 RBC 4.57 HGB 14.30 g/dLHCT 
42.60 %MCV 93.0 fLMCH 31.30 pgMCHC 33.60 g/dLRDW CV 12.30 %MPV 9.50 fLPLT 373 %
NEUT 62.0 %%LYMP 28.0 %%MONO 7.80 %%EOS 1.80 %%BASO 0.40 %#NEUT 6.46 #LYMP 2.91 
#MONO 0.81 #EOS 0.19 #BASO 0.04 

 

 2016 9:27 AM  PREGNANCY TEST UR NEGATIVE 







History Of Immunizations

Not available.



History of Past Illness







 Name  Date of Onset  Comments

 

 Pregnancy, Other Normal  2010  1:34PM   

 

    2010  2:26PM   

 

 Anemia      

 

 Last Pap       2:39PM   

 

    Mar 11 2010 10:14AM   

 

 Pregnancy, Other Normal  Mar 11 2010 10:26AM   

 

    2010  5:14PM   

 

 Contraceptive Counseling  May 25 2010  1:51PM   

 

 Vulvovaginitis  May 25 2010  1:51PM   

 

 IUD Insertion  2010  1:40PM   

 

 Dysfunctional Uterine Bleeding  2010  1:36PM   

 

 Hematoma of leg, right, initial encounter  2016  2:01PM   

 

 Postoperative Follow-up  Aug 24 2016  2:46PM   

 

 Postoperative Follow-up  Aug 31 2016  1:40PM   







Payers







 Insurance Name  Company Name  Plan Name  Plan Number  Policy Number  Policy 
Group Number  Start Date

 

    [place] [employer] [facility] [employer]     
[id number]     N/A

 

    [facility]  [facility]     [id number]     N/A

 

    [employer]  [employer]     [facility] [id number]     N/A







History of Encounters







 Visit Date  Visit Type  Provider

 

 2016  Office visit  Jose Butt MD

 

 2016  Office visit  Jose Butt MD

 

 2016  [facility]  Jose Butt MD

 

 2016  Office visit  Jose Butt MD

 

 2010  Office visit  Stefanie Sanches MD

 

 2010  Office visit  Stefanie Sanches MD

 

 2010  Office visit  Stefanie Sanches MD

 

 2010  Voided  Willie Smart MD

 

 3/11/2010  Office visit  Stefanie Sanches MD

 

 2010  Office visit  Stefanie Sanches MD

 

 2010  Office visit  Stefanie Sanches MD

## 2017-05-07 NOTE — XMS REPORT
[facility]

 Created on: 2015



Shanae Martini

External Reference #: [id number]

: 1985

Sex: Female



Demographics







 Address  [address] [place]  [address]

 

 Home Phone  [phone]

 

 Preferred Language  Unknown

 

 Marital Status  Unknown

 

 Tenriism Affiliation  Unknown

 

 Race  White

 

 Ethnic Group  Not  or 





Author







 Author  SHANTE VARGAS

 

 Organization  [employer]

 

 Address  Unknown

 

 Phone  Unavailable







Care Team Providers







 Care Team Member Name  Role  Phone

 

 SHANTE VARGAS  CP  Unavailable



                                                                



Allergies, Adverse Reactions, Alerts

          





 Substance  Reaction  Event Type

 

 Ambien  Info Not Available  Drug Allergy



                                                                               
         



Problems

          





 Problem Type  Condition  Code  Onset Dates  Condition Status

 

 Assessment  Substance abuse  F19.10     Active

 

 Assessment  General medical exam  Z00.00     Active

 

 Assessment  Depression with anxiety  F41.8     Active

 

 Assessment  Tobacco use  Z72.0     Active

 

 Problem  Frequent loose stools  R19.7     Active

 

 Problem  Family history of ovarian cancer  Z80.41     Active

 

 Problem  OCP (oral contraceptive pills) initiation  Z30.011     Active

 

 Problem  Tobacco use  Z72.0     Active

 

 Problem  Low back pain  M54.5     Active

 

 Problem  Depression with anxiety  F41.8     Active

 

 Problem  Substance abuse  F19.10     Active



                                                                               
                                                                               
                              



Medications

          





 Medication  Code System  Code  Instructions  Start Date  End Date  Status  
Dosage

 

 Sertraline HCl  NDC  [address]-37  25 MG Orally Once a day  2015     
   1 tablet



                                                                               
         



Procedures

          





 Procedure  Coding System  Code  Date

 

 COMPREHEN METABOLIC PANEL  CPT-4  45634  2015

 

 LIPID PANEL  CPT-4  48544  2015

 

 COMPLETE CBC W/AUTO DIFF WBC  CPT-4  39591  2015

 

 URINE PREGNANCY TEST  CPT-4  05792  2015

 

 ASSAY THYROID STIM HORMONE  CPT-4  33477  2015

 

 VENIPUNCT, ROUTINE*  CPT-4  59178  2015

 

 Office Visit, New Pt., Level 3  CPT-4  24097  2015



                                                                               
                                                                               



Vital Signs

          





 Date/Time:  2015

 

 Temperature  97.6 F

 

 Weight  167.2 lbs

 

 Height  67 in

 

 BMI  26.18 Index

 

 Blood Pressure Diastolic  64 mmHg

 

 Blood Pressure Systolic  104 mmHg

 

 Cardiac Monitoring Heart Rate  82 bpm



                                                                    



Results

          





 Name  Result  Date  Reference Range  Unit  Abnormality Flag

 

 PREGNANCY TEST, URINE (IN HOUSE)               



                                                                    



Summary Purpose

          eClinicalWorks Submission

## 2017-05-07 NOTE — XMS REPORT
Continuity of Care Document

 Created on: 2017



HEAVEN RESENDIZ

External Reference #: [id number]

: 1985

Sex: Female



Demographics







 Preferred Language  Unknown

 

 Marital Status  Unknown

 

 Caodaism Affiliation  Unknown

 

 Race  Unknown

 

 Ethnic Group  Unknown





Author







 Author  [facility]

 

 Address  Unknown

 

 Phone  Unavailable



                                                      



Allergies

                      





 Active                    Description                    Code                  
  Type                    Severity                    Reaction                  
  Onset                    Reported/Identified                    Relationship 
to Patient                    Clinical Status                

 

 Yes                    zolpidem tartrate                    [id number]        
            Drug Allergy                    Unknown                    N/A     
                                    2011                                 
                         

 

 Yes                    ambien                                         Drug 
Allergy                                                                        
           2012                                                          

 

 Yes                    Ambien                                         Drug 
Allergy                    N/A                    N/A                          
               2013                                                      
    

 

 Yes                    Zolpidem                    Zolpidem                    
Drug Allergy                    Unknown                    HALLUCINATE         
                                2015                                     
                     



                                                                               
                                       



Medications

                                                                               
         



Problems

                      





 Date Dx Coded                    Attending                    Type            
        Code                    Diagnosis                    Diagnosed By      
          

 

 2009                                                              077.99
                    CONJUNCTIVITIS ACUTE VIRAL                                 
    

 

 2009                                                              616.10
                    VAGINITIS                                     

 

 2009                                                              V72.31
                    Pelvic Exam (Internal)                                     

 

 2009                    TONY APRN, ENEDINA A                            
             077.99                    CONJUNCTIVITIS ACUTE VIRAL              
                       

 

 2009                    TONY APRN, ENEDINA A                            
             616.10                    VAGINITIS                               
      

 

 2009                    TONY APRN, ENEDINA A                            
             V72.31                    Pelvic Exam (Internal)                  
                   

 

 2009                    FOSTER BERRIOSNDA S                          
               077.99                    CONJUNCTIVITIS ACUTE VIRAL            
                         

 

 2009                    NACHO APRN MULU S                          
               616.10                    VAGINITIS                             
        

 

 2009                    NACHO APRN MULU S                          
               V72.31                    Pelvic Exam (Internal)                
                     

 

 2009                    TONY APRN, ENEDINA A                            
             077.99                    CONJUNCTIVITIS ACUTE VIRAL              
                       

 

 2009                    TONY APRN, ENEDINA A                            
             616.10                    VAGINITIS                               
      

 

 2009                    TONY APRN, ENEDINA A                            
             V72.31                    Pelvic Exam (Internal)                  
                   

 

 2009                    ABDULAZIZ APRN, MOHINDER R                           
              077.99                    CONJUNCTIVITIS ACUTE VIRAL             
                        

 

 2009                    ABDULAZIZ APRN, MOHINDER R                           
              616.10                    VAGINITIS                              
       

 

 2009                    ABDULAZIZ APRN, MOHINDER R                           
              V72.31                    Pelvic Exam (Internal)                 
                    

 

 2009                                                              461.0 
                   ACUTE MAXILLARY SINUSITIS                                   
  

 

 2009                                                              786.2 
                   COUGH                                     

 

 2009                    TONY APRN, ENEDINA A                            
             461.0                    ACUTE MAXILLARY SINUSITIS                
                     

 

 2009                    TONY APRN, ENEDINA A                            
             786.2                    COUGH                                     

 

 2009                    MACHO BERRIOSA S                          
               461.0                    ACUTE MAXILLARY SINUSITIS              
                       

 

 2009                    FOSTER BERRIOSNDA S                          
               786.2                    COUGH                                  
   

 

 2009                    TONY APRLEXIE, ENEDINA A                            
             461.0                    ACUTE MAXILLARY SINUSITIS                
                     

 

 2009                    TONY APRN, ENEDINA A                            
             786.2                    COUGH                                     

 

 2009                    SHAUNA GOETZINA R                           
              461.0                    ACUTE MAXILLARY SINUSITIS               
                      

 

 2009                    ABDULAZIZ APRLEXIE, MOHINDER R                           
              786.2                    COUGH                                   
  

 

 2011                                         Ot                    
564.00                                                          

 

 2011                                         Ot                    578.1
                                                          

 

 2011                                         Ot                    787.3
                                                          

 

 2011                                         Ot                    
575.11                    CHRONIC CHOLECYSTITIS                                
     

 

 2012                                                              V25.12
                    IUD REMOVAL                                     

 

 2012                    ENEDINA GUZMAN A                            
             V25.12                    IUD REMOVAL                             
        

 

 2012                    MULU BERRIOS S                          
               V25.12                    IUD REMOVAL                           
          

 

 2012                    TONYENEDINA DUTTON A                            
             V25.12                    IUD REMOVAL                             
        

 

 2012                    SHAUNA GOETZINA R                           
              V25.12                    IUD REMOVAL                            
         

 

 2013                    ENEDINA GUZMAN A                            
             V25.01                    CONTRACEPTION - ORAL CONTRACEPTION      
                               

 

 2013                    ENEDINA GUZMAN A                            
             V73.81                    HPV SCREENING                           
          

 

 2013                    ENEDINA GUZMAN A                            
             V76.10                    BREAST CANCER SCREENING                 
                    

 

 2013                    ENEDINA GUZMAN A                            
             V76.2                    CERVICAL CANCER SCREENING (PAP SMEAR)    
                                 

 

 2013                    MULU BERRIOS S                          
               V25.01                    CONTRACEPTION - ORAL CONTRACEPTION    
                                 

 

 2013                    MACHO BERRIOSA S                          
               V73.81                    HPV SCREENING                         
            

 

 2013                    MACHO BERRIOSA S                          
               V76.10                    BREAST CANCER SCREENING               
                      

 

 2013                    FOSTER BERRIOSNDA S                          
               V76.2                    CERVICAL CANCER SCREENING (PAP SMEAR)  
                                   

 

 2013                    ENEDINA GUZMAN A                            
             V25.01                    CONTRACEPTION - ORAL CONTRACEPTION      
                               

 

 2013                    ENEDINA GUZMAN A                            
             V73.81                    HPV SCREENING                           
          

 

 2013                    ENEDINA GUZMAN A                            
             V76.10                    BREAST CANCER SCREENING                 
                    

 

 2013                    ENEDINA GUZMAN A                            
             V76.2                    CERVICAL CANCER SCREENING (PAP SMEAR)    
                                 

 

 2013                    MOHINDER GOETZ                           
              V25.01                    CONTRACEPTION - ORAL CONTRACEPTION     
                                

 

 2013                    SHAUNA GOETZINA R                           
              V73.81                    HPV SCREENING                          
           

 

 2013                    MOHINDER GOETZ R                           
              V76.10                    BREAST CANCER SCREENING                
                     

 

 2013                    MOHINDER GOETZ R                           
              V76.2                    CERVICAL CANCER SCREENING (PAP SMEAR)   
                                  

 

 10/23/2013                    NACHO LEWIS MULU S                          
               465.9                    UPPER RESPIRATORY INFECTION            
                         

 

 10/23/2013                    FOSTER BERRIOSNDA S                          
               V04.81                    FLU SHOT                              
       

 

 10/23/2013                    ENEDINA GUZMAN A                            
             465.9                    UPPER RESPIRATORY INFECTION              
                       

 

 10/23/2013                    CATRACHITO GUZMANIDI A                            
             V04.81                    FLU SHOT                                
     

 

 10/23/2013                    MOHINDER GOETZ R                           
              465.9                    UPPER RESPIRATORY INFECTION             
                        

 

 10/23/2013                    SHAUNA GOETZINA R                           
              V04.81                    FLU SHOT                               
      

 

 2014                    MOHINDER GOETZ R                           
              724.2                    LUMBAGO/ LOW BACK PAIN                  
                   

 

 2015                                         Ot                    620.2
                                                          

 

 2015                                         Ot                    625.9
                                                          

 

 2015                                         Ot                    599.0
                                                          

 

 2015                                         Ot                    625.9
                                                          

 

 2015                                         Ot                    
787.02                                                          

 

 2015                                         Ot                    
789.00                                                          

 

 2015                                         Ot                    575.8
                                                          

 

 2015                                         Ot                    
V72.63                                                          

 

 2015                                         Ot                    V74.8
                                                          

 

 2015                                         Ot                    620.2
                                                          

 

 2015                                         Ot                    625.9
                                                          

 

 10/05/2015                    BRAYAN MONTALVO APRN                    Ot      
              722.52                                                          

 

 10/27/2015                    BRAYAN MONTALVO APRN                    Ot      
              722.52                                                          

 

 2015                                         Ot                    599.0
                                                          

 

 2015                                         Ot                    625.9
                                                          

 

 2015                                         Ot                    
787.02                                                          

 

 2015                                         Ot                    
789.00                                                          

 

 2015                                         Ot                    575.8
                                                          

 

 2015                                         Ot                    
V72.63                                                          

 

 2015                                         Ot                    V74.8
                                                          

 

 2015                                         Ot                    620.2
                                                          

 

 2015                                         Ot                    625.9
                                                          

 

 2016                    KAHN DC, GYPSY J                    Ot         
           M54.15                    RADICULOPATHY, THORACOLUMBAR REGION       
                              

 

 2016                    KAHN DC, GYPSY J                    Ot         
           M54.5                    LOW BACK PAIN                              
       

 

 2016                    KAHN DC, GYPSY J                    Ot         
           M79.1                    MYALGIA                                     

 

 2016                    KAHN DC, GYPSY J                    Ot         
           R29.3                    ABNORMAL POSTURE                           
          

 

 2016                    KAHN DC, GYPSY J                    Ot         
           M54.15                    RADICULOPATHY, THORACOLUMBAR REGION       
                              

 

 2016                    KAHN DC, GYPSY J                    Ot         
           M54.5                    LOW BACK PAIN                              
       

 

 2016                    KAHN DC, GYPSY J                    Ot         
           M79.1                    MYALGIA                                     

 

 2016                    KAHN DC, GYPSY J                    Ot         
           R29.3                    ABNORMAL POSTURE                           
          

 

 2016                                         Ot                    
787.02                    NAUSEA ALONE                                     

 

 2016                                         Ot                    
789.00                    ABDOMINAL PAIN, UNSPECIFIED SITE                     
                

 

 2016                                         Ot                    575.8
                    DIS OF GALLBLADDER NEC                                     

 

 2016                                         Ot                    
V72.63                    PRE-PROCEDURAL LABORATORY EXAMINATION                
                     

 

 2016                                         Ot                    V74.8
                    SCREEN-BACTERIAL DIS NEC                                   
  

 

 2016                                         Ot                    620.2
                    OVARIAN CYST NEC/NOS                                     

 

 2016                                         Ot                    625.9
                    FEM GENITAL SYMPTOMS NOS                                   
  

 

 2016                    SAMMY PATRICK APRN                    Ot 
                   R10.30                    LOWER ABDOMINAL PAIN, UNSPECIFIED 
                                    

 

 2016                    SAMMY PATRICK APRN                    Ot 
                   Z80.41                    FAMILY HISTORY OF MALIGNANT 
NEOPLASM OF                                      

 

 11/10/2016                    ALEKSANDER HONEYCUTT APRN                    Ot   
                 N83.201                    UNSPECIFIED OVARIAN CYST, RIGHT 
SIDE                                     

 

 11/10/2016                    ALEKSANDER HONEYCUTT APRN                    Ot   
                 R10.31                    RIGHT LOWER QUADRANT PAIN           
                          

 

 11/10/2016                    ALEKSANDER HONEYCUTT APRN                    Ot   
                 N83.201                    UNSPECIFIED OVARIAN CYST, RIGHT 
SIDE                                     

 

 11/10/2016                    ALEKSANDER HONEYCUTT APRN                    Ot   
                 R10.31                    RIGHT LOWER QUADRANT PAIN           
                          

 

 2016                    ALEKSANDER HONEYCUTT APRN                    Ot   
                 N83.201                    UNSPECIFIED OVARIAN CYST, RIGHT 
SIDE                                     

 

 2016                    ALEKSANDER HONEYCUTT APRN                    Ot   
                 R10.31                    RIGHT LOWER QUADRANT PAIN           
                          

 

 2016                    ALEKSANDER HONEYCUTT APRN                    Ot   
                 N83.201                    UNSPECIFIED OVARIAN CYST, RIGHT 
SIDE                                     

 

 2016                    ALEKSANDER HONEYCUTT APRN                    Ot   
                 R10.31                    RIGHT LOWER QUADRANT PAIN           
                          

 

 2017                    MATTHIAS MILES, ALINE N                    Ot        
            N83.201                    UNSPECIFIED OVARIAN CYST, RIGHT SIDE    
                                 

 

 2017                    MATTHIAS MILES, ALINE N                    Ot        
            R10.2                    PELVIC AND PERINEAL PAIN                  
                   

 

 2017                    MATTHIAS MILES, ALINE N                    Ot        
            N83.201                    UNSPECIFIED OVARIAN CYST, RIGHT SIDE    
                                 

 

 2017                    MATTHIAS MILES, ALINE N                    Ot        
            R10.2                    PELVIC AND PERINEAL PAIN                  
                   

 

 2017                                         Ot                    620.2
                    OVARIAN CYST NEC/NOS                                     

 

 2017                                         Ot                    625.9
                    FEM GENITAL SYMPTOMS NOS                                   
  

 

 2017                    SAMMY PATRICK A APRN                    Ot 
                   R10.30                    LOWER ABDOMINAL PAIN, UNSPECIFIED 
                                    

 

 2017                    SAMMY PATRICK APRN                    Ot 
                   Z80.41                    FAMILY HISTORY OF MALIGNANT 
NEOPLASM OF                                      

 

 2017                    ALEKSANDER HONEYCUTT APRN                    Ot   
                 N83.201                    UNSPECIFIED OVARIAN CYST, RIGHT 
SIDE                                     

 

 2017                    ALEKSANDER HONEYCUTT APRN                    Ot   
                 R10.31                    RIGHT LOWER QUADRANT PAIN           
                          

 

 2017                    MATTHIAS MILES ALINE N                    Ot        
            N83.201                    UNSPECIFIED OVARIAN CYST, RIGHT SIDE    
                                 

 

 2017                    MATTHIAS MILES ALINE N                    Ot        
            R10.2                    PELVIC AND PERINEAL PAIN                  
                   

 

 2017                                         Ot                    620.2
                    OVARIAN CYST NEC/NOS                                     

 

 2017                                         Ot                    625.9
                    FEM GENITAL SYMPTOMS NOS                                   
  

 

 2017                    SAMMY PATRICK A APRN                    Ot 
                   R10.30                    LOWER ABDOMINAL PAIN, UNSPECIFIED 
                                    

 

 2017                    SAMMY PATRICK APRN                    Ot 
                   Z80.41                    FAMILY HISTORY OF MALIGNANT 
NEOPLASM OF                                      

 

 2017                    ALEKSANDER HONEYCUTT APRN                    Ot   
                 N83.201                    UNSPECIFIED OVARIAN CYST, RIGHT 
SIDE                                     

 

 2017                    ALEKSANDER HONEYCUTT APRN                    Ot   
                 R10.31                    RIGHT LOWER QUADRANT PAIN           
                          

 

 2017                    MATTHIAS MILES ALINE N                    Ot        
            N83.201                    UNSPECIFIED OVARIAN CYST, RIGHT SIDE    
                                 

 

 2017                    MATTHIAS MILES, ALINE N                    Ot        
            R10.2                    PELVIC AND PERINEAL PAIN                  
                   

 

 2017                    JULIETA JACKSON                    Ot     
               F17.210                    NICOTINE DEPENDENCE, CIGARETTES, 
UNCOMPL                                     

 

 2017                    JULIETA JACKSON                    Ot     
               R10.31                    RIGHT LOWER QUADRANT PAIN             
                        



                                                                               
                                                                               
                                                                               
                                                                               
                                                                               
                                                                               
                                                                               
                                                                               
                                                                               
                                                                               
                                                                               
                                                                               
                                                                               
                                                                               
                                                                               
                                                                               
                                                                          



Procedures

                      





 Code                    Description                    Performed By           
         Performed On                

 

                     24942                                                     
     IUD REMOVAL                                                                           

 

                     38787                                                     
     PAP SMEAR                                                           2013                

 

                                                                          
     PAP SMEAR OBTAIN SMEAR                                                    
       2013                

 

                     69338                                                     
     URINE PREGNANCY TEST (IN-HOUSE)                                           
                2013                

 

                     93417                                                     
     XRAY LUMBAR SPINE 2 OR 3 VIEWS                                            
               2014                



                                                                               
                                                 



Results

                      





 Test                    Result                    Range                









 INFECTIOUS MONO SCREEN - 01/19/15 18:55                









 INFECTIOUS MONO SCREEN                    NEGATIVE                     
NEGATIVE                









 Complete urinalysis with reflex to culture - 17 11:40                









 Urine color determination                    YELLOW                     NRG   
             

 

 Urine clarity determination                    CLEAR                     NRG  
              

 

 Urine pH measurement by test strip                    5                     5-
9                

 

 Specific gravity of urine by test strip                    1.015              
       1.016-1.022                

 

 Urine protein assay by test strip, semi-quantitative                    
NEGATIVE                     NEGATIVE                

 

 Urine glucose detection by automated test strip                    NEGATIVE   
                  NEGATIVE                

 

 Erythrocytes detection in urine sediment by light microscopy                  
  NEGATIVE                     NEGATIVE                

 

 Urine ketones detection by automated test strip                    NEGATIVE   
                  NEGATIVE                

 

 Urine nitrite detection by test strip                    NEGATIVE             
        NEGATIVE                

 

 Urine total bilirubin detection by test strip                    NEGATIVE     
                NEGATIVE                

 

 Urine urobilinogen measurement by automated test strip (mass/volume)          
          NORMAL                     NORMAL                

 

 Urine leukocyte esterase detection by dipstick                    1+          
           NEGATIVE                

 

 Automated urine sediment erythrocyte count by microscopy (number/high power 
field)                    RARE                     NRG                

 

 Automated urine sediment leukocyte count by microscopy (number/high power field
)                     [HPF]                    NRG                

 

 Bacteria detection in urine sediment by light microscopy                    
NEGATIVE                     NRG                

 

 Squamous epithelial cells detection in urine sediment by light microscopy     
               >50                     NRG                

 

 Crystals detection in urine sediment by light microscopy                    
NONE                     NRG                

 

 Casts detection in urine sediment by light microscopy                    NONE 
                    NRG                

 

 Mucus detection in urine sediment by light microscopy                    
NEGATIVE                     NRG                

 

 Complete urinalysis with reflex to culture                    NO              
       NRG                









 Complete blood count (CBC) with automated white blood cell (WBC) differential 
- 17 13:20                









 Blood leukocytes automated count (number/volume)                    7.6 10*3/
uL                    4.3-11.0                

 

 Blood erythrocytes automated count (number/volume)                    4.00 10*6
/uL                    4.35-5.85                

 

 Venous blood hemoglobin measurement (mass/volume)                    12.8 g/dL
                    11.5-16.0                

 

 Blood hematocrit (volume fraction)                    37 %                    
35-52                

 

 Automated erythrocyte mean corpuscular volume                    93 [foz_us]  
                  80-99                

 

 Automated erythrocyte mean corpuscular hemoglobin (mass per erythrocyte)      
              32 pg                    25-34                

 

 Automated erythrocyte mean corpuscular hemoglobin concentration measurement (
mass/volume)                    34 g/dL                    32-36                

 

 Automated erythrocyte distribution width ratio                    12.0 %      
              10.0-14.5                

 

 Automated blood platelet count (count/volume)                    268 10*3/uL  
                  130-400                

 

 Automated blood platelet mean volume measurement                    9.2 [foz_us
]                    7.4-10.4                

 

 Automated blood neutrophils/100 leukocytes                    66 %            
        42-75                

 

 Automated blood lymphocytes/100 leukocytes                    26 %            
        12-44                

 

 Blood monocytes/100 leukocytes                    7 %                    0-12 
               

 

 Automated blood eosinophils/100 leukocytes                    1 %             
       0-10                

 

 Automated blood basophils/100 leukocytes                    0 %               
     0-10                

 

 Blood neutrophils automated count (number/volume)                    5.0 10*3 
                   1.8-7.8                

 

 Blood lymphocytes automated count (number/volume)                    1.9 10*3 
                   1.0-4.0                

 

 Blood monocytes automated count (number/volume)                    0.6 10*3   
                 0.0-1.0                

 

 Automated eosinophil count                    0.1 10*3/uL                    
0.0-0.3                

 

 Automated blood basophil count (count/volume)                    0.0 10*3/uL  
                  0.0-0.1                









 Comprehensive metabolic panel - 17 13:20                









 Serum or plasma sodium measurement (moles/volume)                    138 mmol/
L                    135-145                

 

 Serum or plasma potassium measurement (moles/volume)                    3.9 
mmol/L                    3.6-5.0                

 

 Serum or plasma chloride measurement (moles/volume)                    109 mmol
/L                                    

 

 Carbon dioxide                    22 mmol/L                    21-32          
      

 

 Serum or plasma anion gap determination (moles/volume)                    7 
mmol/L                    5-14                

 

 Serum or plasma urea nitrogen measurement (mass/volume)                    12 
mg/dL                    7-18                

 

 Serum or plasma creatinine measurement (mass/volume)                    0.84 mg
/dL                    0.60-1.30                

 

 Serum or plasma urea nitrogen/creatinine mass ratio                    14     
                NRG                

 

 Serum or plasma creatinine measurement with calculation of estimated 
glomerular filtration rate                    >                     NRG        
        

 

 Serum or plasma glucose measurement (mass/volume)                    81 mg/dL 
                                   

 

 Serum or plasma calcium measurement (mass/volume)                    8.4 mg/dL
                    8.5-10.1                

 

 Serum or plasma total bilirubin measurement (mass/volume)                    
0.6 mg/dL                    0.1-1.0                

 

 Serum or plasma alkaline phosphatase measurement (enzymatic activity/volume)  
                  49 U/L                                    

 

 Serum or plasma aspartate aminotransferase measurement (enzymatic activity/
volume)                    14 U/L                    5-34                

 

 Serum or plasma alanine aminotransferase measurement (enzymatic activity/volume
)                    10 U/L                    0-55                

 

 Serum or plasma protein measurement (mass/volume)                    5.9 g/dL 
                   6.4-8.2                

 

 Serum or plasma albumin measurement (mass/volume)                    3.7 g/dL 
                   3.2-4.5                









 Lipase - 17 13:20                









 Lipase                    37 U/L                    8-78                



                                                                               
                                       



Encounters

                      





 ACCT No.                    Visit Date/Time                    Discharge      
              Status                    Pt. Type                    Provider   
                 Facility                    Loc./Unit                    
Complaint                

 

 [id number]                    2012 10:35:00                    2012 23:
59:59                    [facility]                    Outpatient

## 2017-05-07 NOTE — XMS REPORT
[facility]

 Created on: 10/19/2016



Shanae Martini

External Reference #: [id number]

: 1985

Sex: Female



Demographics







 Address  [address] [place]  [address]

 

 Home Phone  [phone]

 

 Preferred Language  Unknown

 

 Marital Status  Unknown

 

 Protestant Affiliation  Unknown

 

 Race  White

 

 Ethnic Group  Not  or 





Author







 ALEKSANDER Chung

 

 [facility]  [employer]

 

 Address  Unknown

 

 Phone  Unavailable







Care Team Providers







 Care Team Member Name  Role  Phone

 

 ALEKSANDER HONEYCUTT  CP  Unavailable



                                                                



Allergies

          No Known Allergies                                                   
                                     



Problems

          





 Problem Type  Condition  Code  Onset Dates  Condition Status

 

 Problem  Frequent loose stools  R19.7     Active

 

 Problem  Family history of ovarian cancer  Z80.41     Active

 

 Problem  OCP (oral contraceptive pills) initiation  Z30.011     Active

 

 Problem  Tobacco use  Z72.0     Active

 

 Problem  Low back pain  M54.5     Active

 

 Problem  Depression with anxiety  F41.8     Active

 

 Problem  Substance abuse  F19.10     Active



                                                                               
                                                                     



Medications

          No Known Medications                                                 
                             



Results

          No Known Results                                                     
               



Summary Purpose

          [employer] Submission

## 2017-05-07 NOTE — XMS REPORT
[facility]

 Created on: 01/15/2016



Shanae Martini

External Reference #: [id number]

: 1985

Sex: Female



Demographics







 Address  [address] 

[place]  [address]

 

 Home Phone  [phone]

 

 Preferred Language  Unknown

 

 Marital Status  Unknown

 

 Amish Affiliation  Unknown

 

 Race  White

 

 Ethnic Group  Not  or 





Author







 Author  SHANTE VARGAS

 

 Organization  [employer]

 

 Address  Unknown

 

 Phone  Unavailable







Care Team Providers







 Care Team Member Name  Role  Phone

 

 SHANTE VARGAS  CP  Unavailable



                                                                



Allergies, Adverse Reactions, Alerts

          





 Substance  Reaction  Event Type

 

 Ambien  Info Not Available  Drug Allergy



                                                                               
         



Problems

          





 Problem Type  Condition  Code  Onset Dates  Condition Status

 

 Assessment  Sore throat  J02.9     Active

 

 Assessment  Strep throat  J02.0     Active

 

 Problem  Frequent loose stools  R19.7     Active

 

 Problem  Family history of ovarian cancer  Z80.41     Active

 

 Problem  OCP (oral contraceptive pills) initiation  Z30.011     Active

 

 Problem  Tobacco use  Z72.0     Active

 

 Problem  Low back pain  M54.5     Active

 

 Problem  Depression with anxiety  F41.8     Active

 

 Problem  Substance abuse  F19.10     Active



                                                                               
                                                                               
          



Medications

          





 Medication  Code System  Code  Instructions  Start Date  End Date  Status  
Dosage

 

 Azithromycin  NDC  [address]-09  250 MG Orally Once a day       2 tablets  on the first day, then 1 tablet daily for 4 days



                                                                               
         



Procedures

          





 Procedure  Coding System  Code  Date

 

 Office Visit, Est Pt., Level 3  CPT-4  41088  2016

 

 STREP A ASSAY W/OPTIC  CPT-4  81332  2016



                                                                               
                             



Vital Signs

          





 Date/Time:  2016

 

 Temperature  98.9 F

 

 Weight  161.4 lbs

 

 Height  67 in

 

 BMI  25.28 Index

 

 Blood Pressure Diastolic  58 mmHg

 

 Blood Pressure Systolic  108 mmHg

 

 Cardiac Monitoring Heart Rate  68 bpm



                                                                    



Results

          





 Name  Result  Date  Reference Range  Unit  Abnormality Flag

 

 STREP A (IN HOUSE)               

 

 ----STREP A  positive  2016         

 

 ----Control  +  2016         

 

 ----Lot #  [id number]  [id number]         

 

 ----Exp date  2016         



                                                                    



Summary Purpose

          [employer] Submission

## 2017-05-07 NOTE — XMS REPORT
[facility]

 Created on: 2016



Shanae Martini

External Reference #: [id number]

: 1985

Sex: Female



Demographics







 Address  [address] [place]  [address]

 

 Home Phone  [phone]

 

 Preferred Language  Unknown

 

 Marital Status  Unknown

 

 Catholic Affiliation  Unknown

 

 Race  White

 

 Ethnic Group  Not  or 





Author







 ALEKSANDER Chung

 

 [facility]  [employer]

 

 Address  Unknown

 

 Phone  Unavailable







Care Team Providers







 Care Team Member Name  Role  Phone

 

 ALEKSANDER HONEYCUTT  CP  Unavailable



                                                                



Allergies

          No Known Allergies                                                   
                                     



Problems

          





 Problem Type  Condition  Code  Onset Dates  Condition Status

 

 Problem  Frequent loose stools  R19.7     Active

 

 Problem  Family history of ovarian cancer  Z80.41     Active

 

 Problem  OCP (oral contraceptive pills) initiation  Z30.011     Active

 

 Problem  Tobacco use  Z72.0     Active

 

 Problem  Low back pain  M54.5     Active

 

 Problem  Depression with anxiety  F41.8     Active

 

 Problem  Substance abuse  F19.10     Active



                                                                               
                                                                     



Medications

          No Known Medications                                                 
                             



Results

          No Known Results                                                     
               



Summary Purpose

          [employer] Submission

## 2017-05-07 NOTE — XMS REPORT
Continuity of Care Document

 Created on: 2017



HEAVEN RESENDIZ

External Reference #: [id number]

: 1985

Sex: Female



Demographics







 Preferred Language  Unknown

 

 Marital Status  Unknown

 

 Orthodox Affiliation  Unknown

 

 Race  Unknown

 

 Ethnic Group  Unknown





Author







 Author  [facility]

 

 Address  Unknown

 

 Phone  Unavailable



                                                      



Allergies

                      





 Active                    Description                    Code                  
  Type                    Severity                    Reaction                  
  Onset                    Reported/Identified                    Relationship 
to Patient                    Clinical Status                

 

 Yes                    zolpidem tartrate                    [id number]        
            Drug Allergy                    Unknown                    N/A     
                                    2011                                 
                         

 

 Yes                    ambien                                         Drug 
Allergy                                                                        
           2012                                                          

 

 Yes                    Ambien                                         Drug 
Allergy                    N/A                    N/A                          
               2013                                                      
    

 

 Yes                    Zolpidem                    Zolpidem                    
Drug Allergy                    Unknown                    HALLUCINATE         
                                2015                                     
                     



                                                                               
                                       



Medications

                                                                               
         



Problems

                      





 Date Dx Coded                    Attending                    Type            
        Code                    Diagnosis                    Diagnosed By      
          

 

 2009                                                              077.99
                    CONJUNCTIVITIS ACUTE VIRAL                                 
    

 

 2009                                                              616.10
                    VAGINITIS                                     

 

 2009                                                              V72.31
                    Pelvic Exam (Internal)                                     

 

 2009                    TONY APRN, ENEDINA A                            
             077.99                    CONJUNCTIVITIS ACUTE VIRAL              
                       

 

 2009                    TONY APRN, ENEDINA A                            
             616.10                    VAGINITIS                               
      

 

 2009                    TONY APRN, ENEDINA A                            
             V72.31                    Pelvic Exam (Internal)                  
                   

 

 2009                    FOSTER BERRIOSNDA S                          
               077.99                    CONJUNCTIVITIS ACUTE VIRAL            
                         

 

 2009                    NACHO APRN MULU S                          
               616.10                    VAGINITIS                             
        

 

 2009                    NACHO APRN MULU S                          
               V72.31                    Pelvic Exam (Internal)                
                     

 

 2009                    TONY APRN, ENEDINA A                            
             077.99                    CONJUNCTIVITIS ACUTE VIRAL              
                       

 

 2009                    TONY APRN, ENEDINA A                            
             616.10                    VAGINITIS                               
      

 

 2009                    TONY APRN, ENEDINA A                            
             V72.31                    Pelvic Exam (Internal)                  
                   

 

 2009                    ABDULAZIZ APRN, MOHINDER R                           
              077.99                    CONJUNCTIVITIS ACUTE VIRAL             
                        

 

 2009                    ABDULAZIZ APRN, MOHINDER R                           
              616.10                    VAGINITIS                              
       

 

 2009                    ABDULAZIZ APRN, MOHINDER R                           
              V72.31                    Pelvic Exam (Internal)                 
                    

 

 2009                                                              461.0 
                   ACUTE MAXILLARY SINUSITIS                                   
  

 

 2009                                                              786.2 
                   COUGH                                     

 

 2009                    TONY APRN, ENEDINA A                            
             461.0                    ACUTE MAXILLARY SINUSITIS                
                     

 

 2009                    TONY APRN, ENEDINA A                            
             786.2                    COUGH                                     

 

 2009                    MACHO BERRIOSA S                          
               461.0                    ACUTE MAXILLARY SINUSITIS              
                       

 

 2009                    FOSTER BERRIOSNDA S                          
               786.2                    COUGH                                  
   

 

 2009                    TONY APRLEXIE, ENEDINA A                            
             461.0                    ACUTE MAXILLARY SINUSITIS                
                     

 

 2009                    TONY APRN, ENEDINA A                            
             786.2                    COUGH                                     

 

 2009                    SHAUNA GOETZINA R                           
              461.0                    ACUTE MAXILLARY SINUSITIS               
                      

 

 2009                    ABDULAZIZ APRLEXIE, MOHINDER R                           
              786.2                    COUGH                                   
  

 

 2011                                         Ot                    
564.00                                                          

 

 2011                                         Ot                    578.1
                                                          

 

 2011                                         Ot                    787.3
                                                          

 

 2011                                         Ot                    
575.11                    CHRONIC CHOLECYSTITIS                                
     

 

 2012                                                              V25.12
                    IUD REMOVAL                                     

 

 2012                    ENEDINA GUZMAN A                            
             V25.12                    IUD REMOVAL                             
        

 

 2012                    MULU BERRIOS S                          
               V25.12                    IUD REMOVAL                           
          

 

 2012                    TONYENEDINA DUTTON A                            
             V25.12                    IUD REMOVAL                             
        

 

 2012                    SHAUNA GOETZINA R                           
              V25.12                    IUD REMOVAL                            
         

 

 2013                    ENEDINA GUZMAN A                            
             V25.01                    CONTRACEPTION - ORAL CONTRACEPTION      
                               

 

 2013                    ENEDINA GUZMAN A                            
             V73.81                    HPV SCREENING                           
          

 

 2013                    ENEDINA GUZMAN A                            
             V76.10                    BREAST CANCER SCREENING                 
                    

 

 2013                    ENEDINA GUZMAN A                            
             V76.2                    CERVICAL CANCER SCREENING (PAP SMEAR)    
                                 

 

 2013                    MULU BERRIOS S                          
               V25.01                    CONTRACEPTION - ORAL CONTRACEPTION    
                                 

 

 2013                    MACHO BERRIOSA S                          
               V73.81                    HPV SCREENING                         
            

 

 2013                    MACHO BERRIOSA S                          
               V76.10                    BREAST CANCER SCREENING               
                      

 

 2013                    FOSTER BERRIOSNDA S                          
               V76.2                    CERVICAL CANCER SCREENING (PAP SMEAR)  
                                   

 

 2013                    ENEDINA GUZMAN A                            
             V25.01                    CONTRACEPTION - ORAL CONTRACEPTION      
                               

 

 2013                    ENEDINA GUZMAN A                            
             V73.81                    HPV SCREENING                           
          

 

 2013                    ENEDINA GUZMAN A                            
             V76.10                    BREAST CANCER SCREENING                 
                    

 

 2013                    ENEDINA GUZMAN A                            
             V76.2                    CERVICAL CANCER SCREENING (PAP SMEAR)    
                                 

 

 2013                    MOHINDER GOETZ                           
              V25.01                    CONTRACEPTION - ORAL CONTRACEPTION     
                                

 

 2013                    SHAUNA GOETZINA R                           
              V73.81                    HPV SCREENING                          
           

 

 2013                    MOHINDER GOETZ R                           
              V76.10                    BREAST CANCER SCREENING                
                     

 

 2013                    MOHINDER GOETZ R                           
              V76.2                    CERVICAL CANCER SCREENING (PAP SMEAR)   
                                  

 

 10/23/2013                    NACHO LEWIS MULU S                          
               465.9                    UPPER RESPIRATORY INFECTION            
                         

 

 10/23/2013                    FOSTER BERRIOSNDA S                          
               V04.81                    FLU SHOT                              
       

 

 10/23/2013                    ENEDINA GUZMAN A                            
             465.9                    UPPER RESPIRATORY INFECTION              
                       

 

 10/23/2013                    CATRACHITO GUZMANIDI A                            
             V04.81                    FLU SHOT                                
     

 

 10/23/2013                    MOHINDER GOETZ R                           
              465.9                    UPPER RESPIRATORY INFECTION             
                        

 

 10/23/2013                    SHAUNA GOETZINA R                           
              V04.81                    FLU SHOT                               
      

 

 2014                    MOHINDER GOETZ R                           
              724.2                    LUMBAGO/ LOW BACK PAIN                  
                   

 

 2015                                         Ot                    620.2
                                                          

 

 2015                                         Ot                    625.9
                                                          

 

 2015                                         Ot                    599.0
                                                          

 

 2015                                         Ot                    625.9
                                                          

 

 2015                                         Ot                    
787.02                                                          

 

 2015                                         Ot                    
789.00                                                          

 

 2015                                         Ot                    575.8
                                                          

 

 2015                                         Ot                    
V72.63                                                          

 

 2015                                         Ot                    V74.8
                                                          

 

 2015                                         Ot                    620.2
                                                          

 

 2015                                         Ot                    625.9
                                                          

 

 10/05/2015                    BRAYAN MONTALVO APRN                    Ot      
              722.52                                                          

 

 10/27/2015                    BRAYAN MONTALVO APRN                    Ot      
              722.52                                                          

 

 2015                                         Ot                    599.0
                                                          

 

 2015                                         Ot                    625.9
                                                          

 

 2015                                         Ot                    
787.02                                                          

 

 2015                                         Ot                    
789.00                                                          

 

 2015                                         Ot                    575.8
                                                          

 

 2015                                         Ot                    
V72.63                                                          

 

 2015                                         Ot                    V74.8
                                                          

 

 2015                                         Ot                    620.2
                                                          

 

 2015                                         Ot                    625.9
                                                          

 

 2016                    KAHN DC, GYPSY J                    Ot         
           M54.15                    RADICULOPATHY, THORACOLUMBAR REGION       
                              

 

 2016                    KAHN DC, GYPSY J                    Ot         
           M54.5                    LOW BACK PAIN                              
       

 

 2016                    KAHN DC, GYPSY J                    Ot         
           M79.1                    MYALGIA                                     

 

 2016                    KAHN DC, GYPSY J                    Ot         
           R29.3                    ABNORMAL POSTURE                           
          

 

 2016                    AKHN DC, GYPSY J                    Ot         
           M54.15                    RADICULOPATHY, THORACOLUMBAR REGION       
                              

 

 2016                    KAHN DC, GYPSY J                    Ot         
           M54.5                    LOW BACK PAIN                              
       

 

 2016                    KAHN DC, GYPSY J                    Ot         
           M79.1                    MYALGIA                                     

 

 2016                    KAHN DC, GYPSY J                    Ot         
           R29.3                    ABNORMAL POSTURE                           
          

 

 2016                                         Ot                    
787.02                    NAUSEA ALONE                                     

 

 2016                                         Ot                    
789.00                    ABDOMINAL PAIN, UNSPECIFIED SITE                     
                

 

 2016                                         Ot                    575.8
                    DIS OF GALLBLADDER NEC                                     

 

 2016                                         Ot                    
V72.63                    PRE-PROCEDURAL LABORATORY EXAMINATION                
                     

 

 2016                                         Ot                    V74.8
                    SCREEN-BACTERIAL DIS NEC                                   
  

 

 2016                                         Ot                    620.2
                    OVARIAN CYST NEC/NOS                                     

 

 2016                                         Ot                    625.9
                    FEM GENITAL SYMPTOMS NOS                                   
  

 

 2016                    SAMMY PATRICK APRN                    Ot 
                   R10.30                    LOWER ABDOMINAL PAIN, UNSPECIFIED 
                                    

 

 2016                    SAMMY PATRICK APRN                    Ot 
                   Z80.41                    FAMILY HISTORY OF MALIGNANT 
NEOPLASM OF                                      

 

 11/10/2016                    ALEKSANDER HONEYCUTT APRN                    Ot   
                 N83.201                    UNSPECIFIED OVARIAN CYST, RIGHT 
SIDE                                     

 

 11/10/2016                    ALEKSANDER HONEYCUTT APRN                    Ot   
                 R10.31                    RIGHT LOWER QUADRANT PAIN           
                          

 

 11/10/2016                    ALEKSANDER HONEYCUTT APRN                    Ot   
                 N83.201                    UNSPECIFIED OVARIAN CYST, RIGHT 
SIDE                                     

 

 11/10/2016                    ALEKSANDER HONEYCUTT APRN                    Ot   
                 R10.31                    RIGHT LOWER QUADRANT PAIN           
                          

 

 2016                    ALEKSANDER HONEYCUTT APRN                    Ot   
                 N83.201                    UNSPECIFIED OVARIAN CYST, RIGHT 
SIDE                                     

 

 2016                    ALEKSANDER HONEYCUTT APRN                    Ot   
                 R10.31                    RIGHT LOWER QUADRANT PAIN           
                          

 

 2016                    ALEKSANDER HONEYCUTT APRN                    Ot   
                 N83.201                    UNSPECIFIED OVARIAN CYST, RIGHT 
SIDE                                     

 

 2016                    ALEKSANDER HONEYCUTT APRN                    Ot   
                 R10.31                    RIGHT LOWER QUADRANT PAIN           
                          

 

 2017                    MATTHIAS MILES, ALINE N                    Ot        
            N83.201                    UNSPECIFIED OVARIAN CYST, RIGHT SIDE    
                                 

 

 2017                    MATTHIAS MILES, ALINE N                    Ot        
            R10.2                    PELVIC AND PERINEAL PAIN                  
                   

 

 2017                    MATTHIAS MILES, ALINE N                    Ot        
            N83.201                    UNSPECIFIED OVARIAN CYST, RIGHT SIDE    
                                 

 

 2017                    MATTHIAS MILES, ALINE N                    Ot        
            R10.2                    PELVIC AND PERINEAL PAIN                  
                   

 

 2017                                         Ot                    620.2
                    OVARIAN CYST NEC/NOS                                     

 

 2017                                         Ot                    625.9
                    FEM GENITAL SYMPTOMS NOS                                   
  

 

 2017                    SAMMY PATRICK A APRN                    Ot 
                   R10.30                    LOWER ABDOMINAL PAIN, UNSPECIFIED 
                                    

 

 2017                    SAMMY PATRICK APRN                    Ot 
                   Z80.41                    FAMILY HISTORY OF MALIGNANT 
NEOPLASM OF                                      

 

 2017                    ALEKSANDER HONEYCUTT APRN                    Ot   
                 N83.201                    UNSPECIFIED OVARIAN CYST, RIGHT 
SIDE                                     

 

 2017                    ALEKSANDER HONEYCUTT APRN                    Ot   
                 R10.31                    RIGHT LOWER QUADRANT PAIN           
                          

 

 2017                    MATTHIAS MILES ALINE N                    Ot        
            N83.201                    UNSPECIFIED OVARIAN CYST, RIGHT SIDE    
                                 

 

 2017                    MATTHIAS MILES ALINE N                    Ot        
            R10.2                    PELVIC AND PERINEAL PAIN                  
                   

 

 2017                                         Ot                    620.2
                    OVARIAN CYST NEC/NOS                                     

 

 2017                                         Ot                    625.9
                    FEM GENITAL SYMPTOMS NOS                                   
  

 

 2017                    SAMMY PATRICK A APRN                    Ot 
                   R10.30                    LOWER ABDOMINAL PAIN, UNSPECIFIED 
                                    

 

 2017                    SAMMY PATRICK APRN                    Ot 
                   Z80.41                    FAMILY HISTORY OF MALIGNANT 
NEOPLASM OF                                      

 

 2017                    ALEKSANDER HONEYCUTT APRN                    Ot   
                 N83.201                    UNSPECIFIED OVARIAN CYST, RIGHT 
SIDE                                     

 

 2017                    ALEKSANDER HONEYCUTT APRN                    Ot   
                 R10.31                    RIGHT LOWER QUADRANT PAIN           
                          

 

 2017                    MATTHIAS MILES ALINE N                    Ot        
            N83.201                    UNSPECIFIED OVARIAN CYST, RIGHT SIDE    
                                 

 

 2017                    MATTHIAS MILES, ALINE N                    Ot        
            R10.2                    PELVIC AND PERINEAL PAIN                  
                   

 

 2017                    JULIETA JACKSON                    Ot     
               F17.210                    NICOTINE DEPENDENCE, CIGARETTES, 
UNCOMPL                                     

 

 2017                    JULIETA JACKSON                    Ot     
               R10.31                    RIGHT LOWER QUADRANT PAIN             
                        



                                                                               
                                                                               
                                                                               
                                                                               
                                                                               
                                                                               
                                                                               
                                                                               
                                                                               
                                                                               
                                                                               
                                                                               
                                                                               
                                                                               
                                                                               
                                                                               
                                                                          



Procedures

                      





 Code                    Description                    Performed By           
         Performed On                

 

                     73265                                                     
     IUD REMOVAL                                                                           

 

                     62464                                                     
     PAP SMEAR                                                           2013                

 

                                                                          
     PAP SMEAR OBTAIN SMEAR                                                    
       2013                

 

                     38358                                                     
     URINE PREGNANCY TEST (IN-HOUSE)                                           
                2013                

 

                     61613                                                     
     XRAY LUMBAR SPINE 2 OR 3 VIEWS                                            
               2014                



                                                                               
                                                 



Results

                      





 Test                    Result                    Range                









 INFECTIOUS MONO SCREEN - 01/19/15 18:55                









 INFECTIOUS MONO SCREEN                    NEGATIVE                     
NEGATIVE                









 Complete urinalysis with reflex to culture - 17 11:40                









 Urine color determination                    YELLOW                     NRG   
             

 

 Urine clarity determination                    CLEAR                     NRG  
              

 

 Urine pH measurement by test strip                    5                     5-
9                

 

 Specific gravity of urine by test strip                    1.015              
       1.016-1.022                

 

 Urine protein assay by test strip, semi-quantitative                    
NEGATIVE                     NEGATIVE                

 

 Urine glucose detection by automated test strip                    NEGATIVE   
                  NEGATIVE                

 

 Erythrocytes detection in urine sediment by light microscopy                  
  NEGATIVE                     NEGATIVE                

 

 Urine ketones detection by automated test strip                    NEGATIVE   
                  NEGATIVE                

 

 Urine nitrite detection by test strip                    NEGATIVE             
        NEGATIVE                

 

 Urine total bilirubin detection by test strip                    NEGATIVE     
                NEGATIVE                

 

 Urine urobilinogen measurement by automated test strip (mass/volume)          
          NORMAL                     NORMAL                

 

 Urine leukocyte esterase detection by dipstick                    1+          
           NEGATIVE                

 

 Automated urine sediment erythrocyte count by microscopy (number/high power 
field)                    RARE                     NRG                

 

 Automated urine sediment leukocyte count by microscopy (number/high power field
)                     [HPF]                    NRG                

 

 Bacteria detection in urine sediment by light microscopy                    
NEGATIVE                     NRG                

 

 Squamous epithelial cells detection in urine sediment by light microscopy     
               >50                     NRG                

 

 Crystals detection in urine sediment by light microscopy                    
NONE                     NRG                

 

 Casts detection in urine sediment by light microscopy                    NONE 
                    NRG                

 

 Mucus detection in urine sediment by light microscopy                    
NEGATIVE                     NRG                

 

 Complete urinalysis with reflex to culture                    NO              
       NRG                









 Complete blood count (CBC) with automated white blood cell (WBC) differential 
- 17 13:20                









 Blood leukocytes automated count (number/volume)                    7.6 10*3/
uL                    4.3-11.0                

 

 Blood erythrocytes automated count (number/volume)                    4.00 10*6
/uL                    4.35-5.85                

 

 Venous blood hemoglobin measurement (mass/volume)                    12.8 g/dL
                    11.5-16.0                

 

 Blood hematocrit (volume fraction)                    37 %                    
35-52                

 

 Automated erythrocyte mean corpuscular volume                    93 [foz_us]  
                  80-99                

 

 Automated erythrocyte mean corpuscular hemoglobin (mass per erythrocyte)      
              32 pg                    25-34                

 

 Automated erythrocyte mean corpuscular hemoglobin concentration measurement (
mass/volume)                    34 g/dL                    32-36                

 

 Automated erythrocyte distribution width ratio                    12.0 %      
              10.0-14.5                

 

 Automated blood platelet count (count/volume)                    268 10*3/uL  
                  130-400                

 

 Automated blood platelet mean volume measurement                    9.2 [foz_us
]                    7.4-10.4                

 

 Automated blood neutrophils/100 leukocytes                    66 %            
        42-75                

 

 Automated blood lymphocytes/100 leukocytes                    26 %            
        12-44                

 

 Blood monocytes/100 leukocytes                    7 %                    0-12 
               

 

 Automated blood eosinophils/100 leukocytes                    1 %             
       0-10                

 

 Automated blood basophils/100 leukocytes                    0 %               
     0-10                

 

 Blood neutrophils automated count (number/volume)                    5.0 10*3 
                   1.8-7.8                

 

 Blood lymphocytes automated count (number/volume)                    1.9 10*3 
                   1.0-4.0                

 

 Blood monocytes automated count (number/volume)                    0.6 10*3   
                 0.0-1.0                

 

 Automated eosinophil count                    0.1 10*3/uL                    
0.0-0.3                

 

 Automated blood basophil count (count/volume)                    0.0 10*3/uL  
                  0.0-0.1                









 Comprehensive metabolic panel - 17 13:20                









 Serum or plasma sodium measurement (moles/volume)                    138 mmol/
L                    135-145                

 

 Serum or plasma potassium measurement (moles/volume)                    3.9 
mmol/L                    3.6-5.0                

 

 Serum or plasma chloride measurement (moles/volume)                    109 mmol
/L                                    

 

 Carbon dioxide                    22 mmol/L                    21-32          
      

 

 Serum or plasma anion gap determination (moles/volume)                    7 
mmol/L                    5-14                

 

 Serum or plasma urea nitrogen measurement (mass/volume)                    12 
mg/dL                    7-18                

 

 Serum or plasma creatinine measurement (mass/volume)                    0.84 mg
/dL                    0.60-1.30                

 

 Serum or plasma urea nitrogen/creatinine mass ratio                    14     
                NRG                

 

 Serum or plasma creatinine measurement with calculation of estimated 
glomerular filtration rate                    >                     NRG        
        

 

 Serum or plasma glucose measurement (mass/volume)                    81 mg/dL 
                                   

 

 Serum or plasma calcium measurement (mass/volume)                    8.4 mg/dL
                    8.5-10.1                

 

 Serum or plasma total bilirubin measurement (mass/volume)                    
0.6 mg/dL                    0.1-1.0                

 

 Serum or plasma alkaline phosphatase measurement (enzymatic activity/volume)  
                  49 U/L                                    

 

 Serum or plasma aspartate aminotransferase measurement (enzymatic activity/
volume)                    14 U/L                    5-34                

 

 Serum or plasma alanine aminotransferase measurement (enzymatic activity/volume
)                    10 U/L                    0-55                

 

 Serum or plasma protein measurement (mass/volume)                    5.9 g/dL 
                   6.4-8.2                

 

 Serum or plasma albumin measurement (mass/volume)                    3.7 g/dL 
                   3.2-4.5                









 Lipase - 17 13:20                









 Lipase                    37 U/L                    8-78                



                                                                               
                                       



Encounters

                      





 ACCT No.                    Visit Date/Time                    Discharge      
              Status                    Pt. Type                    Provider   
                 Facility                    Loc./Unit                    
Complaint                

 

 [id number]                    2012 10:35:00                    2012 23:
59:59                    [facility]                    Outpatient

## 2017-05-07 NOTE — XMS REPORT
[facility]

 Created on: 2016



Shanae Martini

External Reference #: [id number]

: 1985

Sex: Female



Demographics







 Address  [address] [place]  [address]

 

 Home Phone  [phone]

 

 Preferred Language  Unknown

 

 Marital Status  Unknown

 

 Lutheran Affiliation  Unknown

 

 Race  White

 

 Ethnic Group  Not  or 





Author







 ALEKSANDER Chung

 

 [facility]  [employer]

 

 Address  Unknown

 

 Phone  Unavailable







Care Team Providers







 Care Team Member Name  Role  Phone

 

 ALEKSANDER HONEYCUTT  CP  Unavailable



                                                                



Allergies

          No Known Allergies                                                   
                                     



Problems

          





 Problem Type  Condition  Code  Onset Dates  Condition Status

 

 Assessment  Cyst of right ovary  N83.201     Active

 

 Problem  Frequent loose stools  R19.7     Active

 

 Problem  Family history of ovarian cancer  Z80.41     Active

 

 Problem  OCP (oral contraceptive pills) initiation  Z30.011     Active

 

 Problem  Tobacco use  Z72.0     Active

 

 Problem  Low back pain  M54.5     Active

 

 Problem  Depression with anxiety  F41.8     Active

 

 Problem  Substance abuse  F19.10     Active



                                                                               
                                                                               



Medications

          No Known Medications                                                 
                             



Results

          No Known Results                                                     
               



Summary Purpose

          eClinicalWorks Submission

## 2017-05-07 NOTE — XMS REPORT
MU2 Ambulatory Summary

 Created on: 2016



Shanae Martini

External Reference #: [id number]

: 1985

Sex: Female



Demographics







 Address  [address] 

[place]  [address]

 

 Home Phone  [phone]

 

 Preferred Language  English

 

 Marital Status  

 

 Mormonism Affiliation  Unknown

 

 Race  White

 

 Ethnic Group  Not  or 





Author







 Jose Brunner

 

 [facility]

 

 Address  [address]

[place]  [id number]



 

 Phone  [phone]







Care Team Providers







 Care Team Member Name  Role  Phone

 

 Jose Butt  PCP  Unavailable







Allergies and Adverse Reactions







 Name  Reaction  Notes

 

 NO KNOWN DRUG ALLERGIES      







Plan of Treatment

Not available.



Medications







 Active 

 

 Name  Start Date  Estimated Completion Date  SIG  Comments

 

 Sprintec (28) 0.25-35 mg-mcg oral tablet        take 1 tablet by oral route 
once daily   

 

 sertraline 25 mg oral tablet        take 1 tablet (25 mg) by oral route once 
daily   









  

 

 Name  Start Date  Expiration Date  SIG  Comments

 

 Phenergan 25 mg oral tablet  2010  take 1 tablet by oral route 
3 times a day as needed for 20 days   







Problem List

Not available.



Vital Signs







 Date  Time  BP-Sys(mm[Hg]  BP-Arely(mm[Hg])  HR(bpm)  RR(rpm)  Temp  WT  HT  HC  
BMI  BSA  BMI Percentile  O2 Sat(%)

 

 2016  2:01:00 PM  114 mmHg  70 mmHg  56 bpm  16 rpm  96.8 F  155 lbs  67 
in     24.28 kg/m2  1.82 m2     100 %

 

 2010  1:37:00 PM  118 mmHg  68 mmHg  82 bpm  20 rpm     171.75 lbs        
          

 

 2010  1:55:00 PM  128 mmHg  78 mmHg  88 bpm  20 rpm     175 lbs          
        

 

 2010  1:34:00 PM  108 mmHg  68 mmHg     18 rpm     177 lbs               
   

 

 2010  1:24:00 PM  127 mmHg  63 mmHg  79 bpm  18 rpm  98.2 F  173.375 lbs  
67 in     27.1541 kg/m  1.9281 m     100 %







Social History







 Name  Description  Comments

 

 Tobacco Use     Pt states that she smokes 1/2 ppd from 2009 until 2010.

 

 Regular Exercise     walking







History of Procedures







 Date Ordered  Description  Order Status

 

 3/11/2010 12:00 AM  ALPHA-FETOPROTEIN SERUM  Reviewed

 

 2010 12:00 AM  CYTOPATH C/V THIN LAYER  Reviewed

 

 2010 12:00 AM  CHLAMYDIA CULTURE  Reviewed

 

 2010 12:00 AM  N.GONORRHOEAE DNA AMP PROB  Reviewed

 

 2010 12:00 AM  CULTURE OTHR SPECIMN AEROBIC  Reviewed

 

 2010 12:00 AM  SPECIMEN HANDLING OFFICE-LAB  Reviewed

 

 6/3/2010 12:00 AM  INSERT INTRAUTERINE DEVICE  Reviewed

 

 2010 12:00 AM  URINE PREGNANCY TEST  Reviewed

 

 2010 12:00 AM  COMPLETE CBC W/AUTO DIFF WBC  Reviewed

 

 2010 12:00 AM  CHORIONIC GONADOTROPIN TEST  Reviewed

 

 2010 12:00 AM  CULTURE OTHR SPECIMN AEROBIC  Reviewed

 

 2010 12:00 AM  SPECIMEN HANDLING OFFICE-LAB  Reviewed

 

 2010 12:00 AM  OBSTETRIC PANEL  Reviewed

 

 2010 12:00 AM  SPECIMEN HANDLING OFFICE-LAB  Reviewed

 

 2010 12:00 AM  CULTURE OTHR SPECIMN AEROBIC  Reviewed

 

 2010 12:00 AM  CYTOPATH C/V THIN LAYER  Reviewed

 

 2010 12:00 AM  HIV-1ANTIBODY  Reviewed

 

 2010 12:00 AM  URINALYSIS AUTO W/SCOPE  Reviewed

 

 2010 12:00 AM  CHLAMYDIA CULTURE  Reviewed

 

 2010 12:00 AM  N.GONORRHOEAE DNA AMP PROB  Reviewed

 

 1/15/2010 12:00 AM  URINE PREGNANCY TEST  Reviewed

 

 2010 12:00 AM  OB US < 14 WKS SINGLE FETUS  Reviewed







Results Summary







 Data and Description  Results

 

 2010 2:26 PM  BETA HCG QUANT 3.91 WBC 10.4 RBC 4.57 HGB 14.30 g/dLHCT 
42.60 %MCV 93.0 fLMCH 31.30 pgMCHC 33.60 g/dLRDW CV 12.30 %MPV 9.50 fLPLT 373 %
NEUT 62.0 %%LYMP 28.0 %%MONO 7.80 %%EOS 1.80 %%BASO 0.40 %#NEUT 6.46 #LYMP 2.91 
#MONO 0.81 #EOS 0.19 #BASO 0.04 







History Of Immunizations

Not available.



History of Past Illness







 Name  Date of Onset  Comments

 

 Pregnancy, Other Normal  2010  1:34PM   

 

    2010  2:26PM   

 

 Anemia      

 

 Last Pap       2:39PM   

 

    Mar 11 2010 10:14AM   

 

 Pregnancy, Other Normal  Mar 11 2010 10:26AM   

 

    2010  5:14PM   

 

 Contraceptive Counseling  May 25 2010  1:51PM   

 

 Vulvovaginitis  May 25 2010  1:51PM   

 

 IUD Insertion  2010  1:40PM   

 

 Dysfunctional Uterine Bleeding  2010  1:36PM   

 

 Hematoma of leg, right, initial encounter  2016  2:01PM   







Payers







 Insurance Name  Company Name  Plan Name  Plan Number  Policy Number  Policy 
Group Number  Start Date

 

    [facility]     [id number]     N/A

 

    [facility]     
[id number]     N/A







History of Encounters







 Visit Date  Visit Type  Provider

 

 2016  Office visit  Jose Butt MD

 

 2010  Office visit  Stefanie Sanches MD

 

 2010  Office visit  Stefanie Sanches MD

 

 2010  Office visit  Stefanie Sanches MD

 

 2010  Voided  Willie Smart MD

 

 3/11/2010  Office visit  Stefanie Sanches MD

 

 2010  Office visit  Stefanie Sanches MD

 

 2010  Office visit  Stefanie Sanches MD

## 2017-05-07 NOTE — XMS REPORT
[facility]

 Created on: 2015



Shanae Martini

External Reference #: [id number]

: 1985

Sex: Female



Demographics







 Address  [address] 

[place]  [address]

 

 Home Phone  [phone]

 

 Preferred Language  Unknown

 

 Marital Status  Unknown

 

 Restoration Affiliation  Unknown

 

 Race  White

 

 Ethnic Group  Not  or 





Author







 Author  SAMMY PATRICK

 

 [facility]  [employer]

 

 Address  Unknown

 

 Phone  Unavailable







Care Team Providers







 Care Team Member Name  Role  Phone

 

 SAMMY PATRICK    Unavailable



                                                                



Allergies, Adverse Reactions, Alerts

          





 Substance  Reaction  Event Type

 

 Ambien  Info Not Available  Drug Allergy



                                                                               
         



Problems

          





 Problem Type  Condition  Code  Onset Dates  Condition Status

 

 Assessment  OCP (oral contraceptive pills) initiation  Z30.011     Active

 

 Assessment  Well woman exam  Z01.419     Active

 

 Assessment  Encounter for counseling regarding contraception  Z30.9     Active

 

 Problem  Frequent loose stools  R19.7     Active

 

 Problem  Family history of ovarian cancer  Z80.41     Active

 

 Problem  OCP (oral contraceptive pills) initiation  Z30.011     Active

 

 Problem  Tobacco use  Z72.0     Active

 

 Problem  Low back pain  M54.5     Active

 

 Problem  Depression with anxiety  F41.8     Active

 

 Problem  Substance abuse  F19.10     Active

 

 Assessment  Dyspareunia  N94.1     Active

 

 Assessment  Lower abdominal pain  R10.30     Active

 

 Assessment  Irregular menses  N92.6     Active

 

 Assessment  Family history of ovarian cancer  Z80.41     Active

 

 Assessment  Frequent loose stools  R19.7     Active

 

 Assessment  Vaginal discharge  N89.8     Active

 

 Assessment  Depression with anxiety  F41.8     Active



                                                                               
                                                                               
                                                                               
           



Medications

          





 Medication  Code System  Code  Instructions  Start Date  End Date  Status  
Dosage

 

 Sprintec 28  NDC  [address]-67  0.25-35 MG-MCG Orally Once a day  2015        1 tablet

 

 Sertraline HCl  NDC  [address]-25  25 MG Orally Once a day  2015     
   1 tablet

 

 Flagyl  NDC  [address]-69  500 MG Orally 2 times a day       1 tablet



                                                                               
                             



Procedures

          





 Procedure  Coding System  Code  Date

 

 ASSAY OF PROLACTIN  CPT-4  76725  2015

 

 No Charge  CPT-4  58675  2015

 

 Preventive Care Est Pt. Age 18-39  CPT-4  30312  2015

 

 CULTURE, BACTERIA, OTHER  CPT-4  66047  2015

 

 TRICHOMONAS VAGIN, DIR PROBE  CPT-4  51611  2015



                                                                               
                                                           



Vital Signs

          





 Date/Time:  2015

 

 Temperature  97.6 F

 

 Weight  167.2 lbs

 

 Height  67 in

 

 BMI  26.18 Index

 

 Blood Pressure Diastolic  64 mmHg

 

 Blood Pressure Systolic  104 mmHg

 

 Cardiac Monitoring Heart Rate  82 bpm



                                                                    



Results

          





 Name  Result  Date  Reference Range  Unit  Abnormality Flag

 

 TRICHOMONAS (IN HOUSE)               



                                                                    



Summary Purpose

          eClinicalWorks Submission

## 2017-05-07 NOTE — XMS REPORT
[facility]

 Created on: 2016



Shanae Martini

External Reference #: [id number]

: 1985

Sex: Female



Demographics







 Address  [address] [place]  [address]

 

 Home Phone  [phone]

 

 Preferred Language  Unknown

 

 Marital Status  Unknown

 

 Alevism Affiliation  Unknown

 

 Race  White

 

 Ethnic Group  Not  or 





Author







 ALEKSANDER Chung

 

 [facility]  [employer]Works

 

 Address  Unknown

 

 Phone  Unavailable







Care Team Providers







 Care Team Member Name  Role  Phone

 

 ALEKSANDER HONEYCUTT  CP  Unavailable



                                                                



Allergies

          No Known Allergies                                                   
                                     



Problems

          





 Problem Type  Condition  Code  Onset Dates  Condition Status

 

 Problem  Frequent loose stools  R19.7     Active

 

 Problem  Family history of ovarian cancer  Z80.41     Active

 

 Problem  OCP (oral contraceptive pills) initiation  Z30.011     Active

 

 Problem  Tobacco use  Z72.0     Active

 

 Problem  Low back pain  M54.5     Active

 

 Problem  Depression with anxiety  F41.8     Active

 

 Problem  Substance abuse  F19.10     Active



                                                                               
                                                                     



Medications

          No Known Medications                                                 
                             



Results

          No Known Results                                                     
               



Summary Purpose

          eClinicalWorks Submission

## 2017-05-07 NOTE — XMS REPORT
[facility]

 Created on: 2015



Shanae Martini

External Reference #: [id number]

: 1985

Sex: Female



Demographics







 Address  [address] [place]  [address]

 

 Home Phone  [phone]

 

 Preferred Language  Unknown

 

 Marital Status  Unknown

 

 Adventist Affiliation  Unknown

 

 Race  White

 

 Ethnic Group  Not  or 





Author







 Author  SAMMY PATRICK

 

 [facility]  eClinical[employer]

 

 Address  Unknown

 

 Phone  Unavailable







Care Team Providers







 Care Team Member Name  Role  Phone

 

 SAMMY PATRICK  CP  Unavailable



                                                                



Allergies

          No Known Allergies                                                   
                                     



Problems

          





 Problem Type  Condition  Code  Onset Dates  Condition Status

 

 Assessment  Elevated prolactin level  E22.9     Active

 

 Problem  Frequent loose stools  R19.7     Active

 

 Problem  Family history of ovarian cancer  Z80.41     Active

 

 Problem  OCP (oral contraceptive pills) initiation  Z30.011     Active

 

 Problem  Tobacco use  Z72.0     Active

 

 Problem  Low back pain  M54.5     Active

 

 Problem  Depression with anxiety  F41.8     Active

 

 Problem  Substance abuse  F19.10     Active



                                                                               
                                                                               



Medications

          No Known Medications                                                 
                                       



Procedures

          





 Procedure  Coding System  Code  Date

 

 VENIPUNCT, ROUTINE*  CPT-4  21347  2015

 

 ASSAY OF PROLACTIN  CPT-4  51124  2015



                                                                              



Results

          





 Name  Result  Date  Reference Range  Unit  Abnormality Flag

 

 ROUTINE VENIPUNCTURE               



                                                                    



Summary Purpose

          eClinicalWorks Submission

## 2017-08-17 ENCOUNTER — HOSPITAL ENCOUNTER (EMERGENCY)
Dept: HOSPITAL 75 - ER | Age: 32
Discharge: HOME | End: 2017-08-17
Payer: MEDICAID

## 2017-08-17 VITALS — HEIGHT: 68 IN | WEIGHT: 165 LBS | BODY MASS INDEX: 25.01 KG/M2

## 2017-08-17 VITALS — SYSTOLIC BLOOD PRESSURE: 113 MMHG | DIASTOLIC BLOOD PRESSURE: 69 MMHG

## 2017-08-17 DIAGNOSIS — Z90.49: ICD-10-CM

## 2017-08-17 DIAGNOSIS — F17.210: ICD-10-CM

## 2017-08-17 DIAGNOSIS — R00.2: Primary | ICD-10-CM

## 2017-08-17 LAB
ALBUMIN SERPL-MCNC: 4.2 GM/DL (ref 3.2–4.5)
ALT SERPL-CCNC: 14 U/L (ref 0–55)
ANION GAP SERPL CALC-SCNC: 8 MMOL/L (ref 5–14)
AST SERPL-CCNC: 18 U/L (ref 5–34)
BASOPHILS # BLD AUTO: 0 10^3/UL (ref 0–0.1)
BASOPHILS NFR BLD AUTO: 0 % (ref 0–10)
BILIRUB SERPL-MCNC: 0.7 MG/DL (ref 0.1–1)
BUN SERPL-MCNC: 8 MG/DL (ref 7–18)
BUN/CREAT SERPL: 10
CALCIUM SERPL-MCNC: 9.3 MG/DL (ref 8.5–10.1)
CHLORIDE SERPL-SCNC: 109 MMOL/L (ref 98–107)
CO2 SERPL-SCNC: 23 MMOL/L (ref 21–32)
CREAT SERPL-MCNC: 0.77 MG/DL (ref 0.6–1.3)
EOSINOPHIL # BLD AUTO: 0.1 10^3/UL (ref 0–0.3)
EOSINOPHIL NFR BLD AUTO: 1 % (ref 0–10)
ERYTHROCYTE [DISTWIDTH] IN BLOOD BY AUTOMATED COUNT: 12.2 % (ref 10–14.5)
GFR SERPLBLD BASED ON 1.73 SQ M-ARVRAT: > 60 ML/MIN
GLUCOSE SERPL-MCNC: 78 MG/DL (ref 70–105)
LYMPHOCYTES # BLD AUTO: 2 X 10^3 (ref 1–4)
LYMPHOCYTES NFR BLD AUTO: 26 % (ref 12–44)
MAGNESIUM SERPL-MCNC: 1.8 MG/DL (ref 1.8–2.4)
MCH RBC QN AUTO: 32 PG (ref 25–34)
MCHC RBC AUTO-ENTMCNC: 34 G/DL (ref 32–36)
MCV RBC AUTO: 93 FL (ref 80–99)
MONOCYTES # BLD AUTO: 0.7 X 10^3 (ref 0–1)
MONOCYTES NFR BLD AUTO: 9 % (ref 0–12)
NEUTROPHILS # BLD AUTO: 5.1 X 10^3 (ref 1.8–7.8)
NEUTROPHILS NFR BLD AUTO: 64 % (ref 42–75)
PLATELET # BLD: 275 10^3/UL (ref 130–400)
PMV BLD AUTO: 9.2 FL (ref 7.4–10.4)
POTASSIUM SERPL-SCNC: 3.7 MMOL/L (ref 3.6–5)
PROT SERPL-MCNC: 7.2 GM/DL (ref 6.4–8.2)
RBC # BLD AUTO: 4.5 10^6/UL (ref 4.35–5.85)
SODIUM SERPL-SCNC: 140 MMOL/L (ref 135–145)
TROPONIN I SERPL-MCNC: < 0.3 NG/ML (ref ?–0.3)
WBC # BLD AUTO: 7.9 10^3/UL (ref 4.3–11)

## 2017-08-17 PROCEDURE — 85025 COMPLETE CBC W/AUTO DIFF WBC: CPT

## 2017-08-17 PROCEDURE — 71010: CPT

## 2017-08-17 PROCEDURE — 84484 ASSAY OF TROPONIN QUANT: CPT

## 2017-08-17 PROCEDURE — 80053 COMPREHEN METABOLIC PANEL: CPT

## 2017-08-17 PROCEDURE — 83735 ASSAY OF MAGNESIUM: CPT

## 2017-08-17 PROCEDURE — 93005 ELECTROCARDIOGRAM TRACING: CPT

## 2017-08-17 PROCEDURE — 84443 ASSAY THYROID STIM HORMONE: CPT

## 2017-08-17 PROCEDURE — 84703 CHORIONIC GONADOTROPIN ASSAY: CPT

## 2017-08-17 PROCEDURE — 36415 COLL VENOUS BLD VENIPUNCTURE: CPT

## 2017-08-17 NOTE — ED CARDIAC GENERAL
History of Present Illness


General


Chief Complaint:  Cardiac/General Problems


Stated Complaint:  IRREGULAR HEART BEAT


Nursing Triage Note:  


ADM TO [facility] HAS HAD PALPATIONS ON AND OFF FOR LAST 2 MONTHS


Source:  patient


Exam Limitations:  no limitations





History of Present Illness


Time seen by provider:  18:05


Initial Comments


This 31-year-old young lady presents to the emergency room with complaints of 

palpitations intermittently for the past couple of months.  She describes them 

as a pounding sensation without pain.  Palpitations started around 10:00 this 

morning.  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  She denies 

having the palpitations at this time.  When they occur she feels like her heart 

is racing and shaky.  She has had some mild episodes of shortness of air and 

lightheadedness occasionally with the palpitations.  Symptoms have worsened in 

the past couple weeks.  She denies any stimulants or illicit drug use.  She 

does occasionally drink alcohol less than once per week.  She reports her 

episode this morning felt like an "anxiety attack".  Patient denies pregnancy 

as she had an Nexplanon implanted more than 6 months ago.  She also has been 

having monthly cycles.  Patient was initially noted to have a heart rate in the 

100s upon my entering the room but heart rate critically normalized to the 70s 

after a couple of minutes.  Her primary care provider is Aleksander Garza at [facility].





Allergies and Home Medications


Allergies


Coded Allergies:  


     zolpidem tartrate (Unverified  Adverse Reaction, Unknown, 5/3/11)





Home Medications


No Active Prescriptions or Reported Meds





Review of Systems


Constitutional:  no symptoms reported


EENTM:  No Symptoms Reported


Respiratory:  See HPI


Cardiovascular:  See HPI


Gastrointestinal:  No Symptoms Reported


Genitourinary:  No Symptoms Reported


Musculoskeletal:  no symptoms reported


Skin:  no symptoms reported


Psychiatric/Neurological:  See HPI


Endocrine:  No Symptoms Reported


Hematologic/Lymphatic:  No Symptoms Reported





Past Medical-Social-Family Hx


Patient Social History


Alcohol Use:  Occasionally Uses


Recreational Drug Use:  No


Smoking Status:  Current Everyday Smoker


Type Used:  Cigarettes


2nd Hand Smoke Exposure:  Yes


Recent Foreign Travel:  No


Contact w/Someone Who Travel:  No


Recent Infectious Disease Expo:  No


Recent Hopitalizations:  No





Surgeries


HX Surgeries:  Yes (enlargement of urinary tract, PATIENCE ZARCOEY )


Surgeries:  Gallbladder





Respiratory


Hx Respiratory Disorders:  No





Cardiovascular


Hx Cardiac Disorders:  No





Neurological


Hx Neurological Disorders:  No





Reproductive System


Hx Reproductive Disorders:  No (IMPLANT DEVICE)


Sexually Transmitted Disease:  No





Genitourinary


Hx Genitourinary Disorders:  No





Gastrointestinal


Hx Gastrointestinal Disorders:  No





Musculoskeletal


Hx Musculoskeletal Disorders:  No





Endocrine


Hx Endocrine Disorders:  No





HEENT


HX ENT Disorders:  No





Cancer


Hx Cancer:  No





Psychosocial


Hx Psychiatric Problems:  No





Integumentary


HX Skin/Integumentary Disorder:  No





Blood Transfusions


Hx Blood Disorders:  No





Family Medical History


Significant Family History:  No Pertinent Family Hx





Physical Exam


Vital Signs





Vital Sign - Last 12Hours








 17





 17:05


 


Temp 98.0


 


Pulse 98


 


Resp 18


 


B/P (MAP) 127/64


 


Pulse Ox 98


 


O2 Delivery Room Air





Capillary Refill : Less Than 3 Seconds


General Appearance:  No Apparent Distress, WD/WN


HEENT:  PERRL/EOMI, Normal ENT Inspection, Pharynx Normal


Neck:  Normal Inspection


Respiratory:  Lungs Clear, Normal Breath Sounds, No Accessory Muscle Use, No 

Respiratory Distress


Cardiovascular:  Regular Rate, Rhythm, No Edema, No Murmur, Normal Peripheral 

Pulses


Gastrointestinal:  Normal Bowel Sounds, Non Tender, Soft


Extremity:  Normal Inspection, Non Tender, No Calf Tenderness, No Pedal Edema, 

Other (negative Becca)


Neurologic/Psychiatric:  Alert, Oriented x3, No Motor/Sensory Deficits, Normal 

Mood/Affect, CNs II-XII Norm as Tested


Skin:  Normal Color, Warm/Dry





Progress/Results/Core Measures


Results/Orders


Lab Results





Laboratory Tests








Test


  17


18:37 Range/Units


 


 


White Blood Count


  7.9 


  4.3-11.0


10^3/uL


 


Red Blood Count


  4.50 


  4.35-5.85


10^6/uL


 


Hemoglobin 14.3  11.5-16.0  G/DL


 


Hematocrit 42  35-52  %


 


Mean Corpuscular Volume 93  80-99  FL


 


Mean Corpuscular Hemoglobin 32  25-34  PG


 


Mean Corpuscular Hemoglobin


Concent 34 


  32-36  G/DL


 


 


Red Cell Distribution Width 12.2  10.0-14.5  %


 


Platelet Count


  275 


  130-400


10^3/uL


 


Mean Platelet Volume 9.2  7.4-10.4  FL


 


Neutrophils (%) (Auto) 64  42-75  %


 


Lymphocytes (%) (Auto) 26  12-44  %


 


Monocytes (%) (Auto) 9  0-12  %


 


Eosinophils (%) (Auto) 1  0-10  %


 


Basophils (%) (Auto) 0  0-10  %


 


Neutrophils # (Auto) 5.1  1.8-7.8  X 10^3


 


Lymphocytes # (Auto) 2.0  1.0-4.0  X 10^3


 


Monocytes # (Auto) 0.7  0.0-1.0  X 10^3


 


Eosinophils # (Auto)


  0.1 


  0.0-0.3


10^3/uL


 


Basophils # (Auto)


  0.0 


  0.0-0.1


10^3/uL


 


Sodium Level 140  135-145  MMOL/L


 


Potassium Level 3.7  3.6-5.0  MMOL/L


 


Chloride Level 109 H   MMOL/L


 


Carbon Dioxide Level 23  21-32  MMOL/L


 


Anion Gap 8  5-14  MMOL/L


 


Blood Urea Nitrogen 8  7-18  MG/DL


 


Creatinine


  0.77 


  0.60-1.30


MG/DL


 


Estimat Glomerular Filtration


Rate > 60 


   


 


 


BUN/Creatinine Ratio 10   


 


Glucose Level 78    MG/DL


 


Calcium Level 9.3  8.5-10.1  MG/DL


 


Magnesium Level 1.8  1.8-2.4  MG/DL


 


Total Bilirubin 0.7  0.1-1.0  MG/DL


 


Aspartate Amino Transf


(AST/SGOT) 18 


  5-34  U/L


 


 


Alanine Aminotransferase


(ALT/SGPT) 14 


  0-55  U/L


 


 


Alkaline Phosphatase 54    U/L


 


Troponin I < 0.30  <0.30  NG/ML


 


Total Protein 7.2  6.4-8.2  GM/DL


 


Albumin 4.2  3.2-4.5  GM/DL


 


TSH Cascade Testing


  1.74 


  0.35-4.94


UIU/ML


 


Serum Pregnancy Test,


Qualitative NEGATIVE 


  NEGATIVE  


 








My Orders





Orders - BLU CHILDERS MD


Ekg Tracing (17 18:05)


Monitor-Rhythm Ecg Trace Only (17 18:05)


Cbc With Automated Diff (17 18:31)


Comprehensive Metabolic Panel (17 18:31)


Hcg,Qualitative Serum (17 18:31)


Magnesium (17 18:31)


Thyroid Analyzer (17 18:31)


Troponin I (17 18:31)


Chest 1 View, Ap/Pa Only (17 18:31)





Vital Signs/I&O





Vital Sign - Last 12Hours








 17





 17:05 19:37


 


Temp 98.0 


 


Pulse 98 79


 


Resp 18 16


 


B/P (MAP) 127/64 


 


Pulse Ox 98 99


 


O2 Delivery Room Air Room Air














Blood Pressure Mean:  85








Progress Note :  


   Time:  19:33


Progress Note


Workup was essentially unremarkable.  Patient reports her palpitations have 

resolved and she has had no chest pain.  She is being discharged home to 

outpatient follow-up to continue her workup.





ECG


Initial ECG Impression Date:  Aug 17, 2017


Initial ECG Impression Time:  17:09


Initial ECG Rate:  79


Initial ECG Rhythm:  Normal Sinus


Initial ECG Impression:  Normal


Comment


Normal sinus rhythm with no ST elevation or depression.  No abnormal intervals 

or axis deviation.





Diagnostic Imaging





   Diagonstic Imaging:  Xray


   Plain Films/CT/US/NM/MRI:  chest


Comments


Chest x-ray viewed by me and report reviewed.  See report below:





NAME:      HEAVEN RESENDIZ


[facility] REC#:   [id number]


ACCOUNT#:   [id number]


PT STATUS:   REG ER


:      1985


PHYSICIAN:    BLU CHILDERS MD


ADMIT DATE:   17/ER


***Signed***


Date of Exam:   17





CHEST 1 VIEW, AP/PA ONLY


 





INDICATION: Chest palpitations





Single view of the chest shows normal heart size and vascularity.


The lungs are clear. There is no effusion or pneumothorax. There


is no acute bony abnormality.





IMPRESSION: No acute abnormality is seen.





Dictated by: 





  Dictated on workstation # [id number]





[id number]





Dict:      17


Trans:      17





Interpreted by:         NAREN CHARLES MD


Electronically signed by:   NAREN CHARLES MD 17





Departure


Impression


Impression:  


 Primary Impression:  


 Palpitations


Disposition:  01 HOME, SELF-CARE


Condition:  Improved





Departure-Patient Inst.


Decision time for Depature:  19:30


Referrals:  


NO,LOCAL PHYSICIAN (PCP)


Primary Care Physician








ALEKSANDER HONEYCUTT (Family)


Primary Care Physician


Patient Instructions:  Palpitations (DC)





Add. Discharge Instructions:  


The exact cause of your palpitations is uncertain.  Please follow-up with your 

primary care provider soon as possible for further evaluation.  Return to the 

emergency room if you have worsening symptoms including development of chest 

pain, worsening dizziness or shortness of breath, or worsening palpitations.











All discharge instructions reviewed with patient and/or family. Voiced 

understanding.


Scripts


No Active Prescriptions or Reported Meds





Copy


Copies To 1:   ADDISON MEDINA MD, JOSHUA T MD Aug 17, 2017 6:31 pm

## 2017-08-17 NOTE — DIAGNOSTIC IMAGING REPORT
INDICATION: Chest palpitations



Single view of the chest shows normal heart size and vascularity.

The lungs are clear. There is no effusion or pneumothorax. There

is no acute bony abnormality.



IMPRESSION: No acute abnormality is seen.



Dictated by: 



  Dictated on workstation # [id number]

## 2017-09-05 ENCOUNTER — HOSPITAL ENCOUNTER (OUTPATIENT)
Dept: HOSPITAL 75 - CARD | Age: 32
LOS: 25 days | Discharge: HOME | End: 2017-09-30
Attending: NURSE PRACTITIONER
Payer: MEDICAID

## 2017-09-05 DIAGNOSIS — R00.2: Primary | ICD-10-CM

## 2017-09-05 PROCEDURE — 93225 XTRNL ECG REC<48 HRS REC: CPT

## 2017-09-05 PROCEDURE — 93226 XTRNL ECG REC<48 HR SCAN A/R: CPT

## 2018-09-18 ENCOUNTER — HOSPITAL ENCOUNTER (OUTPATIENT)
Dept: HOSPITAL 75 - RAD | Age: 33
End: 2018-09-18
Attending: OBSTETRICS & GYNECOLOGY
Payer: MEDICAID

## 2018-09-18 DIAGNOSIS — N83.201: ICD-10-CM

## 2018-09-18 DIAGNOSIS — N83.202: Primary | ICD-10-CM

## 2018-09-18 PROCEDURE — 76856 US EXAM PELVIC COMPLETE: CPT

## 2018-09-18 NOTE — DIAGNOSTIC IMAGING REPORT
PROCEDURE: US PELVIC (NON OB)



TECHNIQUE: Multiple real-time grayscale images were obtained over

the pelvis in various projections transabdominally.



INDICATION: Abnormal uterine bleeding.



FINDINGS: Uterus measures 8.3 x 3.8 x 3.9 cm. Endometrium is 5 mm

in thickness. No uterine mass is detected. The right ovary

measures 6.0 x 5.1 x 3.2 cm and the left ovary measures 3.0 x 1.6

x 2.2 cm. Right ovary does contain a 4.4 cm cyst. Left ovary does

contain a 1.5 cm cyst. No other adnexal mass or free fluid is

seen.



IMPRESSION: Bilateral ovarian cysts, largest on the right. No

other significant abnormality is detected.



Dictated by: 



  Dictated on workstation # [id number]